# Patient Record
Sex: FEMALE | Race: WHITE | NOT HISPANIC OR LATINO | Employment: OTHER
[De-identification: names, ages, dates, MRNs, and addresses within clinical notes are randomized per-mention and may not be internally consistent; named-entity substitution may affect disease eponyms.]

---

## 2017-09-06 ENCOUNTER — RECORDS - HEALTHEAST (OUTPATIENT)
Dept: ADMINISTRATIVE | Facility: OTHER | Age: 67
End: 2017-09-06

## 2018-02-21 ENCOUNTER — OFFICE VISIT - HEALTHEAST (OUTPATIENT)
Dept: FAMILY MEDICINE | Facility: CLINIC | Age: 68
End: 2018-02-21

## 2018-02-21 ENCOUNTER — COMMUNICATION - HEALTHEAST (OUTPATIENT)
Dept: FAMILY MEDICINE | Facility: CLINIC | Age: 68
End: 2018-02-21

## 2018-02-21 DIAGNOSIS — Z12.11 SCREEN FOR COLON CANCER: ICD-10-CM
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
## 2018-02-21 DIAGNOSIS — Z12.31 VISIT FOR SCREENING MAMMOGRAM: ICD-10-CM

## 2018-02-21 ASSESSMENT — MIFFLIN-ST. JEOR: SCORE: 1601.35

## 2018-03-13 ENCOUNTER — COMMUNICATION - HEALTHEAST (OUTPATIENT)
Dept: FAMILY MEDICINE | Facility: CLINIC | Age: 68
End: 2018-03-13

## 2018-03-30 ENCOUNTER — OFFICE VISIT - HEALTHEAST (OUTPATIENT)
Dept: FAMILY MEDICINE | Facility: CLINIC | Age: 68
End: 2018-03-30

## 2018-03-30 DIAGNOSIS — L72.3 SEBACEOUS CYST OF LEFT AXILLA: ICD-10-CM

## 2018-03-30 DIAGNOSIS — K63.5 COLON POLYPS: ICD-10-CM

## 2018-03-30 DIAGNOSIS — L82.1 SEBORRHEIC KERATOSES: ICD-10-CM

## 2018-03-30 DIAGNOSIS — Z12.39 BREAST CANCER SCREENING: ICD-10-CM

## 2018-03-30 ASSESSMENT — MIFFLIN-ST. JEOR: SCORE: 1595.9

## 2018-04-06 ENCOUNTER — HOSPITAL ENCOUNTER (OUTPATIENT)
Dept: MAMMOGRAPHY | Facility: CLINIC | Age: 68
Discharge: HOME | End: 2018-04-06
Attending: FAMILY MEDICINE

## 2018-04-06 DIAGNOSIS — Z12.31 VISIT FOR SCREENING MAMMOGRAM: ICD-10-CM

## 2018-04-09 ENCOUNTER — OFFICE VISIT - HEALTHEAST (OUTPATIENT)
Dept: FAMILY MEDICINE | Facility: CLINIC | Age: 68
End: 2018-04-09

## 2018-04-09 DIAGNOSIS — Z80.0 FAMILY HISTORY OF COLON CANCER: ICD-10-CM

## 2018-04-09 DIAGNOSIS — K63.5 COLON POLYP: ICD-10-CM

## 2018-04-09 DIAGNOSIS — M54.50 LOW BACK PAIN: ICD-10-CM

## 2018-04-23 ENCOUNTER — OFFICE VISIT - HEALTHEAST (OUTPATIENT)
Dept: PHYSICAL THERAPY | Facility: REHABILITATION | Age: 68
End: 2018-04-23

## 2018-04-23 DIAGNOSIS — M19.90 OSTEOARTHRITIS: ICD-10-CM

## 2018-04-23 DIAGNOSIS — M54.50 LUMBAGO: ICD-10-CM

## 2018-05-21 ENCOUNTER — RECORDS - HEALTHEAST (OUTPATIENT)
Dept: ADMINISTRATIVE | Facility: OTHER | Age: 68
End: 2018-05-21

## 2018-05-22 ENCOUNTER — RECORDS - HEALTHEAST (OUTPATIENT)
Dept: ADMINISTRATIVE | Facility: OTHER | Age: 68
End: 2018-05-22

## 2018-06-04 ENCOUNTER — COMMUNICATION - HEALTHEAST (OUTPATIENT)
Dept: PHYSICAL THERAPY | Facility: CLINIC | Age: 68
End: 2018-06-04

## 2018-06-11 ENCOUNTER — OFFICE VISIT - HEALTHEAST (OUTPATIENT)
Dept: PHYSICAL THERAPY | Facility: REHABILITATION | Age: 68
End: 2018-06-11

## 2018-06-11 DIAGNOSIS — M54.50 LUMBAGO: ICD-10-CM

## 2018-06-11 DIAGNOSIS — M19.90 OSTEOARTHRITIS: ICD-10-CM

## 2018-06-18 ENCOUNTER — OFFICE VISIT - HEALTHEAST (OUTPATIENT)
Dept: PHYSICAL THERAPY | Facility: REHABILITATION | Age: 68
End: 2018-06-18

## 2018-06-18 DIAGNOSIS — M19.90 OSTEOARTHRITIS: ICD-10-CM

## 2018-06-18 DIAGNOSIS — M54.50 LUMBAGO: ICD-10-CM

## 2018-06-25 ENCOUNTER — OFFICE VISIT - HEALTHEAST (OUTPATIENT)
Dept: PHYSICAL THERAPY | Facility: REHABILITATION | Age: 68
End: 2018-06-25

## 2018-06-25 DIAGNOSIS — M54.50 LUMBAGO: ICD-10-CM

## 2018-06-25 DIAGNOSIS — M19.90 OSTEOARTHRITIS: ICD-10-CM

## 2018-07-30 ENCOUNTER — COMMUNICATION - HEALTHEAST (OUTPATIENT)
Dept: FAMILY MEDICINE | Facility: CLINIC | Age: 68
End: 2018-07-30

## 2018-08-02 ENCOUNTER — COMMUNICATION - HEALTHEAST (OUTPATIENT)
Dept: FAMILY MEDICINE | Facility: CLINIC | Age: 68
End: 2018-08-02

## 2018-08-22 ENCOUNTER — OFFICE VISIT - HEALTHEAST (OUTPATIENT)
Dept: PHYSICAL THERAPY | Facility: REHABILITATION | Age: 68
End: 2018-08-22

## 2018-08-22 DIAGNOSIS — M19.90 OSTEOARTHRITIS: ICD-10-CM

## 2018-08-22 DIAGNOSIS — M54.50 LUMBAGO: ICD-10-CM

## 2018-08-31 ENCOUNTER — COMMUNICATION - HEALTHEAST (OUTPATIENT)
Dept: FAMILY MEDICINE | Facility: CLINIC | Age: 68
End: 2018-08-31

## 2018-08-31 DIAGNOSIS — Z00.00 HEALTH CARE MAINTENANCE: ICD-10-CM

## 2018-09-04 ENCOUNTER — AMBULATORY - HEALTHEAST (OUTPATIENT)
Dept: LAB | Facility: CLINIC | Age: 68
End: 2018-09-04

## 2018-09-04 ENCOUNTER — OFFICE VISIT - HEALTHEAST (OUTPATIENT)
Dept: FAMILY MEDICINE | Facility: CLINIC | Age: 68
End: 2018-09-04

## 2018-09-04 DIAGNOSIS — Z00.00 HEALTH CARE MAINTENANCE: ICD-10-CM

## 2018-09-04 DIAGNOSIS — R53.83 FATIGUE: ICD-10-CM

## 2018-09-04 DIAGNOSIS — R63.5 WEIGHT GAIN: ICD-10-CM

## 2018-09-04 DIAGNOSIS — Z00.00 ROUTINE GENERAL MEDICAL EXAMINATION AT A HEALTH CARE FACILITY: ICD-10-CM

## 2018-09-04 LAB
CHOLEST SERPL-MCNC: 227 MG/DL
ERYTHROCYTE [DISTWIDTH] IN BLOOD BY AUTOMATED COUNT: 13.2 % (ref 11–14.5)
FASTING STATUS PATIENT QL REPORTED: YES
FASTING STATUS PATIENT QL REPORTED: YES
GLUCOSE BLD-MCNC: 96 MG/DL (ref 70–125)
HCT VFR BLD AUTO: 41 % (ref 35–47)
HDLC SERPL-MCNC: 46 MG/DL
HGB BLD-MCNC: 13.8 G/DL (ref 12–16)
LDLC SERPL CALC-MCNC: 151 MG/DL
MCH RBC QN AUTO: 27.4 PG (ref 27–34)
MCHC RBC AUTO-ENTMCNC: 33.6 G/DL (ref 32–36)
MCV RBC AUTO: 81 FL (ref 80–100)
PLATELET # BLD AUTO: 238 THOU/UL (ref 140–440)
PMV BLD AUTO: 7.9 FL (ref 7–10)
RBC # BLD AUTO: 5.04 MILL/UL (ref 3.8–5.4)
TRIGL SERPL-MCNC: 151 MG/DL
TSH SERPL DL<=0.005 MIU/L-ACNC: 2.87 UIU/ML (ref 0.3–5)
WBC: 7.6 THOU/UL (ref 4–11)

## 2018-09-04 ASSESSMENT — MIFFLIN-ST. JEOR: SCORE: 1591.37

## 2018-09-17 ENCOUNTER — OFFICE VISIT - HEALTHEAST (OUTPATIENT)
Dept: PHYSICAL THERAPY | Facility: REHABILITATION | Age: 68
End: 2018-09-17

## 2018-09-17 DIAGNOSIS — M19.90 OSTEOARTHRITIS: ICD-10-CM

## 2018-09-17 DIAGNOSIS — M54.50 LUMBAGO: ICD-10-CM

## 2018-10-12 ENCOUNTER — OFFICE VISIT - HEALTHEAST (OUTPATIENT)
Dept: FAMILY MEDICINE | Facility: CLINIC | Age: 68
End: 2018-10-12

## 2018-10-12 DIAGNOSIS — L72.3 SEBACEOUS CYST: ICD-10-CM

## 2018-10-17 ENCOUNTER — OFFICE VISIT - HEALTHEAST (OUTPATIENT)
Dept: PHYSICAL THERAPY | Facility: REHABILITATION | Age: 68
End: 2018-10-17

## 2018-10-17 DIAGNOSIS — M54.50 LUMBAGO: ICD-10-CM

## 2018-10-17 DIAGNOSIS — M19.90 OSTEOARTHRITIS: ICD-10-CM

## 2019-06-09 ENCOUNTER — COMMUNICATION - HEALTHEAST (OUTPATIENT)
Dept: SCHEDULING | Facility: CLINIC | Age: 69
End: 2019-06-09

## 2019-06-09 ENCOUNTER — OFFICE VISIT - HEALTHEAST (OUTPATIENT)
Dept: FAMILY MEDICINE | Facility: CLINIC | Age: 69
End: 2019-06-09

## 2019-06-09 DIAGNOSIS — R05.9 COUGH: ICD-10-CM

## 2019-06-09 DIAGNOSIS — J02.0 STREPTOCOCCAL PHARYNGITIS: ICD-10-CM

## 2019-06-09 DIAGNOSIS — J06.9 UPPER RESPIRATORY TRACT INFECTION, UNSPECIFIED TYPE: ICD-10-CM

## 2019-06-09 DIAGNOSIS — J02.9 SORETHROAT: ICD-10-CM

## 2019-06-09 LAB — DEPRECATED S PYO AG THROAT QL EIA: ABNORMAL

## 2019-06-09 ASSESSMENT — MIFFLIN-ST. JEOR: SCORE: 1484.32

## 2019-12-17 ENCOUNTER — COMMUNICATION - HEALTHEAST (OUTPATIENT)
Dept: SCHEDULING | Facility: CLINIC | Age: 69
End: 2019-12-17

## 2019-12-22 ENCOUNTER — OFFICE VISIT - HEALTHEAST (OUTPATIENT)
Dept: FAMILY MEDICINE | Facility: CLINIC | Age: 69
End: 2019-12-22

## 2019-12-22 DIAGNOSIS — H10.33 ACUTE CONJUNCTIVITIS OF BOTH EYES, UNSPECIFIED ACUTE CONJUNCTIVITIS TYPE: ICD-10-CM

## 2020-02-07 ENCOUNTER — OFFICE VISIT - HEALTHEAST (OUTPATIENT)
Dept: FAMILY MEDICINE | Facility: CLINIC | Age: 70
End: 2020-02-07

## 2020-02-07 DIAGNOSIS — J40 BRONCHITIS: ICD-10-CM

## 2020-02-07 DIAGNOSIS — R05.9 COUGH: ICD-10-CM

## 2020-02-07 DIAGNOSIS — R04.0 EPISTAXIS: ICD-10-CM

## 2020-02-07 DIAGNOSIS — Z12.31 VISIT FOR SCREENING MAMMOGRAM: ICD-10-CM

## 2021-03-12 ENCOUNTER — AMBULATORY - HEALTHEAST (OUTPATIENT)
Dept: NURSING | Facility: CLINIC | Age: 71
End: 2021-03-12

## 2021-04-02 ENCOUNTER — AMBULATORY - HEALTHEAST (OUTPATIENT)
Dept: NURSING | Facility: CLINIC | Age: 71
End: 2021-04-02

## 2021-05-17 ENCOUNTER — OFFICE VISIT - HEALTHEAST (OUTPATIENT)
Dept: FAMILY MEDICINE | Facility: CLINIC | Age: 71
End: 2021-05-17

## 2021-05-17 DIAGNOSIS — N95.8 OTHER SPECIFIED MENOPAUSAL AND PERIMENOPAUSAL DISORDERS: ICD-10-CM

## 2021-05-17 DIAGNOSIS — E78.2 MIXED HYPERLIPIDEMIA: ICD-10-CM

## 2021-05-17 DIAGNOSIS — Z12.31 VISIT FOR SCREENING MAMMOGRAM: ICD-10-CM

## 2021-05-17 DIAGNOSIS — Z13.820 SCREENING FOR OSTEOPOROSIS: ICD-10-CM

## 2021-05-17 DIAGNOSIS — R07.89 ATYPICAL CHEST PAIN: ICD-10-CM

## 2021-05-17 DIAGNOSIS — E66.01 MORBID OBESITY (H): ICD-10-CM

## 2021-05-17 LAB
ATRIAL RATE - MUSE: 76 BPM
DIASTOLIC BLOOD PRESSURE - MUSE: NORMAL
INTERPRETATION ECG - MUSE: NORMAL
P AXIS - MUSE: 42 DEGREES
PR INTERVAL - MUSE: 164 MS
QRS DURATION - MUSE: 76 MS
QT - MUSE: 358 MS
QTC - MUSE: 402 MS
R AXIS - MUSE: 5 DEGREES
SYSTOLIC BLOOD PRESSURE - MUSE: NORMAL
T AXIS - MUSE: 18 DEGREES
VENTRICULAR RATE- MUSE: 76 BPM

## 2021-05-17 ASSESSMENT — MIFFLIN-ST. JEOR: SCORE: 1534.67

## 2021-05-27 VITALS
DIASTOLIC BLOOD PRESSURE: 74 MMHG | OXYGEN SATURATION: 96 % | BODY MASS INDEX: 37.53 KG/M2 | TEMPERATURE: 100 F | WEIGHT: 225.25 LBS | HEART RATE: 81 BPM | SYSTOLIC BLOOD PRESSURE: 126 MMHG | HEIGHT: 65 IN

## 2021-05-29 NOTE — PROGRESS NOTES
ASSESSMENT:   1. Sorethroat     2. Streptococcal pharyngitis  Rapid Strep A Screen-Throat swab    amoxicillin (AMOXIL) 500 MG capsule   3. Cough  benzonatate (TESSALON) 200 MG capsule   4. Upper respiratory tract infection, unspecified type  fluticasone propionate (FLONASE) 50 mcg/actuation nasal spray       PLAN:  Results from the rapid strep test was positive today, consistent with patients history and physical exam. Patient was prescribed amoxicillin antibiotic to be taken twice a day for 10. Patient was also encouraged to replace her toothbrush after 2 doses of the antibiotic. In addition to treatment for streptococcal pharyngitis, the patient was also provided with OTC and prescriptions remedies for an upper respiratory infection.     Patient instructed to follow-up with primary care if symptoms persist or as needed.     I discussed red flag symptoms, return precautions to clinic/ER and follow up care with patient.  Expected clinical course, symptomatic cares advised. Questions answered. Patient/parent amenable with plan.    Patient Instructions:  Patient Instructions   Your rapid strep test was positive today. We will treat you with a course of antibiotics. Please complete the full course of antibiotics. Please take with food. Take a probiotic or eat a Greek yogurt daily while you are on the antibiotic. You will be contagious until you have completed 24 hours of the medication.  Please discard your toothbrush and replace with a new toothbrush to avoid reinfection.    Please use Tylenol 650mg every 4 hours or ibuprofen 600mg every 6 hours by mouth for pain/fever.  Do not exceed 4000mg of acetaminophen or 2400mg of ibuprofen from any source in a 24 hour period.  Taking Tylenol and ibuprofen together may be helpful in reducing pain.      May use salt water gargles and hot teas for comfort. Dissolve one teaspoon of salt in one cup of warm water to make a salt water gargle.    Watch for resolution of symptoms in  "the next few days. If you continue to have fevers, begin to have difficulty swallowing or breathing, notice neck pain or difficulty moving your neck, please return to clinic or present to the ER immediately.  Otherwise, follow up with your PCP as needed.    May use tessalon for cough, Flonase for nasal symptoms.      SUBJECTIVE:   Susan Contreras is a 69 y.o. female who presents alone today with 5 days complaint of sore throat, in which she describes as dry, feeling like \"knives when I swallow\". She also complains of nasal congestion, rhinorrhea, watery itchy  eyes, increased mucus production with productive cough. She states most nights she has to sleep in her recliner due to her cough. Denies fever, stating she has been sweating more than normal, but is unsure if she has been febrile. Sick contacts: denies. Has not tried any over the counter treatments. Oral intake has decreased due to discomfort, she has been able to continue with fluid intake.       ROS:  Comprehensive 12 pt ROS completed, positives noted in HPI, otherwise negative.      Past Medical History:  Patient Active Problem List   Diagnosis     Hyperhidrosis     Dysfunctional Uterine Bleeding     Arthritis     Hyperglycemia     Lower Back Pain     Headache     Esophageal Reflux     Lump In / On The Skin      Migraine     Osteoarthritis     Benign neoplasm of colon     Benign neoplasm of sigmoid colon     Special screening for malignant neoplasms, colon     Diverticular disease of colon     Colorectal polyps     Diverticular disease of large intestine     Diverticulosis large intestine w/o perforation or abscess w/o bleeding     Diverticulosis of colon     Encounter for screening for malignant neoplasm of colon     Family history of malignant neoplasm of gastrointestinal tract     Neoplasm of uncertain behavior of stomach, intestines, and rectum     Polyp of colon     History of colonic polyps     Hemorrhoids       Surgical History:  Past Surgical " "History:   Procedure Laterality Date     [place] DILATION/CURETTAGE,DIAGNOSTIC      Description: Dilation And Curettage;  Recorded: 07/08/2014;     [place] REMOVAL OF TONSILS,<11 Y/O      Description: Tonsillectomy;  Recorded: 07/08/2014;     SHOULDER ARTHROSCOPY  1982           Family History:  Family History   Problem Relation Age of Onset     Heart disease Mother      Lymphoma Mother      Diabetes Father        Reviewed; Non-contributory    Social History     Tobacco Use   Smoking Status Never Smoker   Smokeless Tobacco Never Used       Smoking: denies    Smoke exposure: denies; reports second-hand smoke exposure as a child from parents; additionally, 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       No current facility-administered medications on file prior to visit.        Allergies:   Allergies   Allergen Reactions     Acetaminophen Unknown     Codeine Nausea And Vomiting     House Dust      Hydrocodone Bitartrate Unknown     Hydrocodone-Acetaminophen      Abdominal pain       Latex Unknown       OBJECTIVE:   Vitals:    06/09/19 0935   BP: 110/60   Pulse: 83   Resp: 14   Temp: 98.3  F (36.8  C)   TempSrc: Oral   SpO2: 97%   Weight: 212 lb 6.4 oz (96.3 kg)   Height: 5' 5\" (1.651 m)     Physical exam reveals a pleasant 69 y.o. female.   General: Appears healthy, alert and cooperative. Non-toxic appearance.  Eyes:  No conjunctivitis, lids normal.   Ears:  Normal appearing auricle. External auditory meatus without excessive cerumen, edema or erythema. normal TMs bilaterally.  No mastoid tenderness. No pain with palpation over tragus.  Nose:    Mucosa normal. No rhinorrhea. No sinus tenderness.  Mouth:  Mucosa pink and moist.  moderate erythema and postnasal drip. No trismus. No evidence of PTA. Normal " phonation.  Neck: normal, supple and no adenopathy  Pulmonary/Chest: Chest is clear, no wheezing, rhonchi or rales. Symmetric air entry throughout both lung fields. Symmetrical chest wall movement.  Heart: regular rate and rhythm, no murmur, rub or gallop  Abdomen: soft, nontender. No masses or organomegaly  Neuro: Alert, oriented. Non-focal.  Skin: pink, warm, dry, and without lesions on limited skin exam. No rashes.  Psychiatric: Normal mood and affect.  Normal judgement and thought content. Normal behavior.       RADIOLOGY    No results found.    LABORATORY STUDIES    Recent Results (from the past 24 hour(s))   Rapid Strep A Screen-Throat swab   Result Value Ref Range    Rapid Strep A Antigen Group A Strep detected (!) No Group A Strep detected, presumptive negative       Dayana Kennedy DNP Student      I, Zuleika Kc, was present with the NP student who participated in the service and in the documentation of the note.  I have verified the history and personally performed the physical exam and medical decision making.  I agree with the assessment and plan of care as documented in the note.       Zuleika Kc, CNP

## 2021-05-29 NOTE — TELEPHONE ENCOUNTER
Pt calling with sore throat and coughing up green and yellow phlegm, moving more into the chest, no wheezing. Pt states sweaty.  Pt not able to sleep in bed last night, due to coughing pt had to sleep in the chair for the night.  Pt intends to go to [facility].  Domonique Griggs RN, MA  [facility] RN Triage Nurse Advisor      Reason for Disposition    [1] Continuous (nonstop) coughing interferes with work or school AND [2] no improvement using cough treatment per Care Advice    Protocols used: COUGH - ACUTE [id number]

## 2021-05-29 NOTE — PATIENT INSTRUCTIONS - HE
Your rapid strep test was positive today. We will treat you with a course of antibiotics. Please complete the full course of antibiotics. Please take with food. Take a probiotic or eat a Greek yogurt daily while you are on the antibiotic. You will be contagious until you have completed 24 hours of the medication.  Please discard your toothbrush and replace with a new toothbrush to avoid reinfection.    Please use Tylenol 650mg every 4 hours or ibuprofen 600mg every 6 hours by mouth for pain/fever.  Do not exceed 4000mg of acetaminophen or 2400mg of ibuprofen from any source in a 24 hour period.  Taking Tylenol and ibuprofen together may be helpful in reducing pain.      May use salt water gargles and hot teas for comfort. Dissolve one teaspoon of salt in one cup of warm water to make a salt water gargle.    Watch for resolution of symptoms in the next few days. If you continue to have fevers, begin to have difficulty swallowing or breathing, notice neck pain or difficulty moving your neck, please return to clinic or present to the ER immediately.  Otherwise, follow up with your PCP as needed.    May use tessalon for cough, Flonase for nasal symptoms.

## 2021-06-01 VITALS — WEIGHT: 236 LBS | BODY MASS INDEX: 39.32 KG/M2 | HEIGHT: 65 IN

## 2021-06-01 VITALS — BODY MASS INDEX: 39.49 KG/M2 | WEIGHT: 237 LBS | HEIGHT: 65 IN

## 2021-06-01 VITALS — HEIGHT: 65 IN | WEIGHT: 238.2 LBS | BODY MASS INDEX: 39.69 KG/M2

## 2021-06-02 VITALS — WEIGHT: 237 LBS | BODY MASS INDEX: 39.44 KG/M2

## 2021-06-03 VITALS — BODY MASS INDEX: 35.39 KG/M2 | HEIGHT: 65 IN | WEIGHT: 212.4 LBS

## 2021-06-04 VITALS
BODY MASS INDEX: 31.85 KG/M2 | SYSTOLIC BLOOD PRESSURE: 117 MMHG | OXYGEN SATURATION: 98 % | TEMPERATURE: 97.8 F | HEART RATE: 73 BPM | RESPIRATION RATE: 17 BRPM | DIASTOLIC BLOOD PRESSURE: 69 MMHG | WEIGHT: 191.4 LBS

## 2021-06-04 VITALS
WEIGHT: 190 LBS | OXYGEN SATURATION: 99 % | DIASTOLIC BLOOD PRESSURE: 68 MMHG | TEMPERATURE: 100.1 F | SYSTOLIC BLOOD PRESSURE: 106 MMHG | HEART RATE: 88 BPM | BODY MASS INDEX: 31.62 KG/M2

## 2021-06-04 NOTE — TELEPHONE ENCOUNTER
"Pink eye since Friday.  Yesterday went to eye doctor. Eye exam was normal and diagnosed with viral pink eye, no medication needed.    Now both eyes this morning.    Both look better than yesterday.    Discharge in the morning.  Cleans it off with tissue.    Patient asking for eye drops to be called in for her pink eye.  She cares for her 10 year old grandson and does not want it to spread.    Has been working hard at weight [employer].  Down to 195#.  Patient vented about her \"free  son\".  Trouble with her adult son living there and she says as of the new year she will be charging him $600 rent and expecting more from him.  He lives with his girlfriend and his 10 year old son in the other side of the duplex. The new year will bring change.  Must put more on him and make him do more for himself.  She is tired and always short of money.  This is \"the year of Susan\".  She wants pcp to know she is doing well and losing weight.  She knows she is due for a physical also.    Clary Arellano RN Triage and refills      Reason for Disposition    Very small amount of discharge and only in corner of eye    Protocols used: EYE - PUS OR [id number]      "

## 2021-06-04 NOTE — TELEPHONE ENCOUNTER
Please inform patient that there is no indication for eyedrops.  A prescription for eyedrop medication will not prevent the spread of such an infection.  When pinkeye is caused by a virus as most cases are and that is what her eye doctor felt it was antibiotics will do no good to treat it or to prevent the spread.  She just needs to be diligent about washing her hands.

## 2021-06-05 NOTE — PATIENT INSTRUCTIONS - HE
For your symptoms:  -nasal saline spray/wash (netipot) - would also help moisturize your nasal tissue to prevent nosebleeds  -using a humidifer  -ok to put thin layer of vaseline in your nose to moisturize tissue as well  -pinch nostrils closed and breathe with your mouth  -consider starting oral antihistamine such claritin/allegra during the day and could consider benadryl at night  -ok to use robitussin or cough drops/lozenges  -start tessalon perles for cough suppression three times a day as needed  -ok to use vicks rub  -print a prescription for azithromycin antibiotic for you to fill if this is not improving over the new few days or if it is worsening    If things are significantly worsening make sure you are seen again for a recheck

## 2021-06-05 NOTE — PROGRESS NOTES
Assessment/Plan:    1. Cough  2. Bronchitis  Most likely bronchitis is cause of pt's symptoms. Discussed management with the following: start oral antihistamine, robitussin/cough drops as needed, start tessalon perles, provided printed prescription for azithromycin for pt to fill in a few days if symptoms not improving, consider vicks rub. Recheck as needed.   - benzonatate (TESSALON PERLES) 100 MG capsule; Take 1 capsule (100 mg total) by mouth 3 (three) times a day as needed for cough.  Dispense: 30 capsule; Refill: 0  - azithromycin (ZITHROMAX Z-SARANYA) 250 MG tablet; Take 2 tablets (500 mg) on  Day 1,  followed by 1 tablet (250 mg) once daily on Days 2 through 5.  Dispense: 6 tablet; Refill: 0    3. Epistaxis  Had nosebleed earlier today - no bleeding currently. Recommend the following: start using humidifier, start nasal saline, put thin layer of vaseline on nasal septum, if bleeding occurs pinch nasal bridge.     4. Visit for screening mammogram  Due for mammogram - ordered today.  - Mammo Screening Bilateral; Future      Follow up: 1 week for recheck if needed    Leticia Obrien MD  [facility]    Subjective:    Patient ID: Susan Contreras is a 69 y.o. female is here today for cough, congestion    Cough, congestion  -Tuesday symptoms started - but thought may have been dust/allergy related  -Wednesday woke up feeling much more sick - cough, hoarse voice  -Thursday morning - no voice, yellow mucus/phlegm, nasal congestion  -earlier today got bloody nose - right side always she says, hx cauterization  -no known sick contacts but recently went to "[employer]  -thinks may have been having fever Wed night - chills at that time  -reports hx pneumonia  -hasn't tried any OTC meds yet  -feels like azithromycin helped for similar sx in the past  -low grade fever but otherwise nml vitals      Patient Active Problem List   Diagnosis     Hyperhidrosis     Dysfunctional Uterine Bleeding     Arthritis      Hyperglycemia     Lower Back Pain     Headache     Esophageal Reflux     Lump In / On The Skin      Migraine     Osteoarthritis     Benign neoplasm of colon     Diverticular disease of colon     Diverticulosis of colon     Family history of malignant neoplasm of gastrointestinal tract     Hemorrhoids     Past Medical History:   Diagnosis Date     Arthritis      Past Surgical History:   Procedure Laterality Date     [place] DILATION/CURETTAGE,DIAGNOSTIC      Description: Dilation And Curettage;  Recorded: 07/08/2014;     [place] REMOVAL OF TONSILS,<13 Y/O      Description: Tonsillectomy;  Recorded: 07/08/2014;     SHOULDER ARTHROSCOPY  1982     No current outpatient medications on file prior to visit.     No current facility-administered medications on file prior to visit.      Allergies   Allergen Reactions     Acetaminophen Unknown     Codeine Nausea And Vomiting     House Dust      Hydrocodone Bitartrate Unknown     Hydrocodone-Acetaminophen      Abdominal pain       Latex Unknown     Social History     Socioeconomic History     Marital status:      Spouse name: Not on file     Number of children: Not on file     Years of education: Not on file     Highest education level: Not on file   Occupational History     Not on file   Social Needs     Financial resource strain: Not on file     Food insecurity:     Worry: Not on file     Inability: Not on file     Transportation needs:     Medical: Not on file     Non-medical: Not on file   Tobacco Use     Smoking status: Never Smoker     Smokeless tobacco: Never Used   Substance and Sexual Activity     Alcohol use: Not on file     Drug use: Not on file     Sexual activity: Not on file   Lifestyle     Physical activity:     Days per week: Not on file     Minutes per session: Not on file     Stress: Not on file   Relationships     Social connections:     Talks on phone: Not on file     Gets together: Not on file     Attends Jewish service: Not on file     Active member  of club or organization: Not on file     Attends meetings of clubs or organizations: Not on file     Relationship status: Not on file     Intimate partner violence:     Fear of current or ex partner: Not on file     Emotionally abused: Not on file     Physically abused: Not on file     Forced sexual activity: Not on file   Other Topics Concern     Not on file   Social History Narrative     Not on file     Family History   Problem Relation Age of Onset     Heart disease Mother      Lymphoma Mother      Diabetes Father      Review of systems is as stated in HPI, and the remainder of system review is otherwise negative.    Objective:      /68   Pulse 88   Temp 100.1  F (37.8  C)   Wt 190 lb (86.2 kg)   SpO2 99%   BMI 31.62 kg/m      General appearance: awake, NAD  HEENT: atraumatic, normocephalic, PERRL, no scleral icterus or injection, TMs normal bilaterally without erythema or effusion, no sinus tenderness, no erythema of posterior oropharynx, moist mucous membranes, mildly hoarse voice, no epistaxis  Neck: supple, no lymphadenopathy, normal ROM  CV: RRR, no murmurs/rubs/gallops, normal S1 and S2  Lungs: CTAB, no wheezes or crackles, breathing comfortably on room air, frequent cough observed   Extremities: no LE edema bilaterally, moving all extremities  Skin: no rashes or lesions  Neuro: alert, oriented x3, CNs grossly intact, no focal deficits appreciated  Psych: normal mood/affect/behavior, answering questions appropriately, linear thought process

## 2021-06-16 PROBLEM — K64.9 HEMORRHOIDS: Status: ACTIVE | Noted: 2018-09-04

## 2021-06-16 PROBLEM — E66.01 MORBID OBESITY (H): Status: ACTIVE | Noted: 2021-05-17

## 2021-06-16 NOTE — PROGRESS NOTES
Assessment:     1. Fall (on) (from) other stairs and steps, initial encounter     2. Visit for screening mammogram  Mammo Screening Bilateral   3. Screen for colon cancer  Ambulatory referral for Colonoscopy       Plan:     1. Visit for screening mammogram  Chart review the following will be ordered  - Mammo Screening Bilateral; Future    2. Screen for colon cancer  Patient needs the following test which was not done by the patient last year; she needs to see Dr. Sandoval for complete examination in 2 months and immunization update  - Ambulatory referral for Colonoscopy    3. Fall (on) (from) other stairs and steps, initial encounter  Signs of late head injury were discussed with patient quite low risk for intra-cranial hematoma but situation discussed      Subjective:   Patient was well but unfortunately she was going out to her mailbox and did not wear any spikes or well cleated boots and slipped on her stairs landed on her left occipital scalp took most of the blow with her buttocks on the left side and her left shoulder she had some bleeding from her head wound she applied some ice she did not develop any nausea or headache today she is aching in her shoulder and in her hip but minimally so she is able to walk without any discomfort she has no complaints with regards to headache nausea or vomiting or disturbance of vision over the cranial nerves she has a well-healed superficial laceration of the left occiput which does not need to be secondarily repaired.  Funduscopic exam unremarkable orthopedic examination and musculoskeletal exam is suspicious for just a contusion of the superior shoulder muscles on the left side range of motion of left shoulder and arm normal gait and station normal no hip pain whatsoever.  Situation discussed with patient we may discharge her to home I do not feel imaging is any important at this point if she develops further symptoms or signs of intracerebral hematoma she will call us she  "needs to return in 2 months to see Dr. Schaeffer for immunization update    Review of Systems: A complete 14 point review of systems was obtained and is negative or as stated in the history of present illness.    Past Medical History:   Diagnosis Date     Arthritis      Family History   Problem Relation Age of Onset     Heart disease Mother      Lymphoma Mother      Diabetes Father      Past Surgical History:   Procedure Laterality Date     [place] DILATION/CURETTAGE,DIAGNOSTIC      Description: Dilation And Curettage;  Recorded: 07/08/2014;     [place] REMOVAL OF TONSILS,<13 Y/O      Description: Tonsillectomy;  Recorded: 07/08/2014;     SHOULDER ARTHROSCOPY  1982     Social History   Substance Use Topics     Smoking status: Never Smoker     Smokeless tobacco: Never Used     Alcohol use None         Objective:   /72 (Patient Site: Left Arm, Patient Position: Sitting, Cuff Size: Adult Large)  Pulse 88  Resp 18  Ht 5' 5\" (1.651 m)  Wt (!) 238 lb 3.2 oz (108 kg)  SpO2 98%  BMI 39.64 kg/m2    General Appearance:  Alert, cooperative, no distress  Head:  Normocephalic, no obvious abnormality  Ears: TM anatomy normal  Eyes:  PERRL, EOM's intact, conjunctiva and corneas clear  Nose:  Nares symmetrical, septum midline, mucosa pink, no sinus tenderness  Throat:  Lips, tongue, and mucosa are moist, pink, and intact  Neck:  Supple, symmetrical, trachea midline, no adenopathy; thyroid: no enlargement, symmetric,no tenderness/mass/nodules; no carotid bruit, no JVD  Back:  Symmetrical, no curvature, ROM normal, no CVA tenderness  Chest/Breast:  No mass or tenderness  Lungs:  Clear to auscultation bilaterally, respirations unlabored   Heart:  Normal PMI, regular rate & rhythm, S1 and S2 normal, no murmurs, rubs, or gallops  Abdomen:  Soft, non-tender, bowel sounds active all four quadrants, no mass, or organomegaly  Musculoskeletal:  Tone and strength strong and symmetrical, all extremities patient has some pain in the superior " portion of her shoulder near the midline AC joint tight no glenoid tenderness range of motion shoulder all normal  Lymphatic:  No adenopathy  Skin/Hair/Nails:  Skin warm, dry, and intact, no rashes  Neurologic:  Alert and oriented x3, no cranial nerve deficits, normal strength and tone, gait steady funduscopic exam negative  Extremities:  No edema.  Dior's sign negative.  Some bruising over left posterior buttock and anterior thigh but no hip pain at all  Genitourinary: deferred  Pulses:  Equal bilaterally           This note has been dictated using voice recognition software. Any grammatical or context distortions are unintentional and inherent to the the software.

## 2021-06-17 NOTE — PROGRESS NOTES
" Patient ID: Susan Contreras is a 67 y.o. female.  /76  Pulse 70  Ht 5' 5\" (1.651 m)  Wt (!) 237 lb (107.5 kg)  SpO2 98%  BMI 39.44 kg/m2    Assessment/Plan:                Diagnoses and all orders for this visit:    Seborrheic keratoses    Sebaceous cyst of left axilla    Colon polyps    Breast cancer screening    DISCUSSION  The concerns near the breast tissue are long-standing and clear-cut benign findings.  I do not feel she requires a diagnostic mammogram based on these dermatologic benign concerns.  I recommend she get her mammogram scheduled soon for routine screening.    She should schedule her colonoscopy.    Discussed these considerations in depth today for a total of 25 minutes.  Subjective:     HPI    Susan Contreras is a 67-year-old female who does not have any major health concerns.  She is here today after having not been seen for nearly 2 years by me.  Patient is here primarily to discuss colon cancer and breast cancer screening but she brings up multiple other considerations and has a follow-up visit scheduled relatively soon.    Patient had reported she has a lump in the skin and a cyst in her left axilla.  She was trying to schedule a routine mammogram but upon this report she was instructed to come in to consider the need for diagnostic mammography.  She has a long-standing history of a seborrheic keratosis in the skin of the medial left breast.  This is what she is referring to by a lump on the skin.  This is long-standing and unchanged.  She is a history of cystic structures within the left axilla.  She has had previous incisions and cyst removals.  Patient reports that there is a residual small cyst in the left axilla that is unchanged.    She does have a family history of colon cancer and a personal history of colon polyps she is overdue for routine colonoscopy screening.  She does have the test scheduled but was quite apprehensive about it and we spent some time today " "discussing this.    He was seen fairly recently for a fall and also is reporting some flareup of chronic back symptoms.  We touched on these issues briefly today.    Review of Systems  Complete review of systems is obtained.  Other than the specific considerations noted above complete review of systems is negative.    Objective:   Medications:  Current Outpatient Prescriptions   Medication Sig     ibuprofen (ADVIL,MOTRIN) 200 MG tablet Take 200 mg by mouth every 6 (six) hours as needed for pain.     Allergies:  Allergies   Allergen Reactions     Codeine      House Dust      Hydrocodone-Acetaminophen      Abdominal pain       Tobacco:   reports that she has never smoked. She has never used smokeless tobacco.     Physical Exam      /76  Pulse 70  Ht 5' 5\" (1.651 m)  Wt (!) 237 lb (107.5 kg)  SpO2 98%  BMI 39.44 kg/m2      General: Patient alert no signs of distress    HEENT: Tympanic membranes are clear no scleral icterus or conjunctival irritation    Lungs: Clear no wheeze crackle or other focal sounds    Heart: Regular rate and rhythm no murmur    Breast: Limited exam, area of concern in the skin is clearly a seborrheic keratosis which has been present long-term.  No concern or need for diagnostic mammogram based on this.  The second concern is a skin cyst which is long-standing in the left axilla.  It is not inflamed and shows no new characteristics compared to previous exams.            "
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
## 2021-06-17 NOTE — PROGRESS NOTES
"    Assessment & Plan     Susan was seen today for chest pain this past saturday.    Diagnoses and all orders for this visit:    Atypical chest pain  -     Electrocardiogram Perform and Read  -     XR Chest 2 Views  -     NM Exercise Stress Test; Future  -Chest x-ray and EKG are normal.  Suspect that her discomfort is musculoskeletal in nature given tenderness of left anterior ribs with palpation on exam today.  However she does have some risk factors for heart disease including obesity, age and underlying hyperlipidemia.  We also discussed that GERD can be the cause of her discomfort as well.  We will get a stress test to further evaluate for underlying cardiovascular disease as cause of her symptoms.  Encouraged her to schedule an appointment with her PCP for physical and fasting labs.  If she experiences another episode of similar chest pain, would recommend she be seen in emergency department    Visit for screening mammogram  -     Mammo Screening Bilateral; Future    Screening for osteoporosis  -     DXA Bone Density Scan; Future    Morbid obesity (H)  She is doing weight watchers and working on weight loss efforts    Other specified menopausal and perimenopausal disorders   -     DXA Bone Density Scan; Future    Mixed hyperlipidemia  She is working on weight loss efforts.  Encouraged her to schedule appointment with PCP for physical and fasting labs               BMI:   Estimated body mass index is 38.07 kg/m  as calculated from the following:    Height as of this encounter: 5' 4.5\" (1.638 m).    Weight as of this encounter: 225 lb 4 oz (102.2 kg).   The following high BMI interventions were performed this visit: encouragement to exercise    Return in about 3 months (around 8/17/2021) for Annual physical.    Arabella Amaya MD  [facility]   Susan Contreras is 71 y.o. and presents today for the following health issues   HPI   She is seen today for concern of an episode " of chest pain that occurred on Saturday which is 2 days ago.  This occurred about 5 PM and lasted several hours.  She has history of obesity and mild hyperlipidemia, not on medication.  She is not a smoker.  Reports that she has had intermittent chest pain over the last several years.  Usually these episodes will go away fairly quickly.  On Saturday she experienced a sharp and stabbing pain on the left side of her chest.  This was quite uncomfortable.  Lasted several hours and eventually started to ease up about 10 PM.  Pain has subsequently resolved.  She comes in today because she is concerned about whether could be her heart or her lungs.  Reports that she does have a history of pneumonia.  She also has history of secondhand smoke exposure.  She reports history of GERD and states that she has tendency to get food caught on the left side of her throat.  She has been doing some heavy lifting about a week ago.  States that she did go walking at the [employer] on Friday afternoon with a friend and recalls that she did feel a little bit winded while doing so and had to stop and rest.  She has been under a lot of stress lately.  She is overweight but has lost 50 pounds through weight watchers and is working on dietary changes.  She drinks about 1 cup of coffee a day.  She recalls that on Saturday prior to the episode of chest pain she really did not have the best diet.  She ate some she does and rice crispy bar around 1 PM.  Had a bowl of Cheerios around 11 AM.  Had her usual cup of coffee with some cookies first thing in the morning.  There is family history of heart problems in her mother.  Her grandmother had a pacemaker.  Patient has not had a physical or labs in a couple of years.  Review of systems is otherwise negative.  No other concerns or questions today.          Review of Systems   All other systems reviewed and are negative.          Objective    /74 (Patient Site: Right Arm, Patient Position:  "Sitting, Cuff Size: Adult Large)   Pulse 81   Temp 100  F (37.8  C) (Temporal)   Ht 5' 4.5\" (1.638 m)   Wt (!) 225 lb 4 oz (102.2 kg)   SpO2 96%   BMI 38.07 kg/m    Body mass index is 38.07 kg/m .  Physical Exam   Constitutional: She appears well-developed and well-nourished.   Cardiovascular: Normal rate, regular rhythm and normal heart sounds.   No murmur heard.  Pulmonary/Chest: Effort normal and breath sounds normal. No respiratory distress. She has no wheezes.   Abdominal: Soft. Bowel sounds are normal. She exhibits no distension. There is abdominal tenderness in the epigastric area.   Musculoskeletal: There is tenderness with palpation over the left lower rib cage underneath the left breast    Results: EKG was performed in clinic.  This was personally reviewed.  Per my read it was normal.    Chest x-ray was performed in clinic and personally reviewed.  Per my read this was negative                "

## 2021-06-17 NOTE — PATIENT INSTRUCTIONS - HE
Patient Instructions by Cristian Price DO at 12/22/2019  8:00 AM     Author: Cristian Price DO Service: -- Author Type: Physician    Filed: 12/22/2019  8:31 AM Encounter Date: 12/22/2019 Status: Addendum    : Cristian Price DO (Physician)    Related Notes: Original Note by Cristian Price DO (Physician) filed at 12/22/2019  8:30 AM       I recommend you use the antibiotic eye drop treatment if the mattering should recur you noted yesterday. See the handout for treatment advice. I think your eye condition is likely viral in origin, will likely continue to improve without antibiotic treatment.     Patient Education     Conjunctivitis Caused by Infection     Wash hands often to help prevent spreading infection.     Infections are caused by viruses or germs (bacteria). Treatment includes keeping your eyes and hands clean. Your healthcare provider may prescribe eye drops, and tell you to stay home from work or school if youre contagious. Untreated infections can be serious. It's important to see your provider for a diagnosis.  Viral infections  A cold, flu, or other virus can spread to your eyes. This causes a watery discharge. Your eyes may burn or itch and get red. Your eyelids may also be puffy and sore.  Treatment  Most viral infections go away on their own. Artificial tears and warm compresses can relieve symptoms. Your healthcare provider may also prescribe eye drops. A viral infection can be very contagious and spread quickly. To prevent this, wash your hands often. Use a separate tissue to wipe each eye. Dont touch your eyes or share bedding or towels. Use a new, clean washcloth every day. Throw away eye cosmetics, especially mascara. Never use someone else's eye cosmetics. If you use contact lenses, follow your healthcare provider's instructions on proper lens care.   Bacterial infections  Bacterial infections often happen in one eye. There may be a watery or a thick discharge from the eye. These  infections can cause serious damage to your eye if not treated promptly.  Treatment  Your healthcare provider may prescribe eye drops or ointment to kill the bacteria. Use the medicine for the number of days it is prescribed. Don't stop using it when the symptoms improve. Warm compresses can help keep the eyelids clean. To keep the bacteria from spreading, wash your hands often. Use a separate tissue to wipe each eye. Don't touch your eyes or share bedding or towels. Use a new, clean washcloth every day. Throw away eye cosmetics, especially mascara. Never use someone else's eye cosmetics. If you use contact lenses, follow your healthcare provider's instructions on proper lens care.   Date Last Reviewed: 10/1/2017    5165-6763 [employer] [facility]. [address] [place] [address], [place] [address]. All rights reserved. This information is not intended as a substitute for professional medical care. Always follow your healthcare professional's instructions.
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
## 2021-06-18 NOTE — PROGRESS NOTES
[facility] Daily Progress /Monthly Summary    Patient Name: Susan Contreras  Date: 2018  Visit #:    Referral Diagnosis: Low Back Pain  Referring provider: Jerod Schaeffer MD  Visit Diagnosis:     ICD-10-CM    1. Lower Back Pain M54.5    2. Osteoarthritis M19.90          Assessment:     Patient is benefitting from skilled physical therapy and is making steady progress toward functional goals.    Goal Status:  Patient will twist/turn : for cooking/cleaning;for bed mobilitiy;with less difficulty;in 12 weeks;Comment  Comment:: with 0-5/10 LBP: current status is 1-10/10, generally 0-5/10 in the last week.  Pt will: lift objects to 25# including groceries with 0-5/10 LBP in 12 weeks: current status is that this is still very hard to do,- 6-7/10, and sore the day after doing it. Even milk is still hard to carry.  Pt will: have resting LBP 0-3/10 in 12 weeks: Current status is 0-1/10, better       Plan / Patient Education:     Continue with initial plan of care.   Continue ALL and visceral fascia releases to decrease  LBP and improve twisting  Add supine trunk rotation    Subjective:     Pain Ratin  I am doing so much better with my back pain than 2-3 months ago!      Objective:     Modified Oswestry Low Back Pain Disablity Questionnaire  in %: 54   Trunk flexion is 7 1/2 inches fingertips to floor- 2/10 LBP  Visually has an increase in right waist crease and increased right quadratus lumborum.    Treatment Today     TREATMENT MINUTES COMMENTS   Evaluation     Self-care/ Home management     Manual therapy 55 Supine MT/SCS to neck, chest and lumbar region including: PLLT1-L5 stacked dysfunctions, Left BRANDI-LV, Right IJ and R PEC-LV,    Neuromuscular Re-education     Therapeutic Activity     Therapeutic Exercises     Gait training     Modality__________________                Total 55    Blank areas are intentional and mean the treatment did not include these items.       Carlotta DIXON  [facility]  6/11/2018

## 2021-06-18 NOTE — PROGRESS NOTES
[facility] Daily Progress     Patient Name: Susan Contreras  Date: 2018  Visit #:    Referral Diagnosis:  Low Back Pain  Referring provider: Jerod Schaeffer MD  Visit Diagnosis:     ICD-10-CM    1. Lower Back Pain M54.5    2. Osteoarthritis M19.90          Assessment:     Patient is benefitting from skilled physical therapy and is making steady progress toward functional goals.   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 She reports by the end of the week she needed to wear her back brace due to LBP being and 8-10/10, even to bed.    Goal Status:  Patient will twist/turn : for cooking/cleaning;for bed mobilitiy;with less difficulty;in 12 weeks;Comment  Comment:: with 0-5/10 LBP: current status is 1-10/10, generally 0-5/10 in the last week.  Pt will: lift objects to 25# including groceries with 0-5/10 LBP in 12 weeks: current status is that this is still very hard to do,- 6-7/10, and sore the day after doing it. Even milk is still hard to carry.  Pt will: have resting LBP 0-3/10 in 12 weeks: Current status is 0-1/10, better    Plan / Patient Education:     Continue with initial plan of care.    Subjective:     Pain Ratin I am doing pretty good except when I bend repetitively or twisting.  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 I waited 22 minutes for my brother in his new condo- made me busy.  The school had no air conditioning and it was hard to breath!    Prolonged standing while teaching increased my LBP to 8-10/10, and also stooping and reaching and lifting caused me increased pain.    Objective:     Trunk flexion is 6 inches fingertips to floor  Right groin and LBP and left front thigh to the knee -toothache.  PSIS level    Treatment Today     TREATMENT MINUTES COMMENTS   Evaluation     Self-care/ Home management     Manual therapy 30  Supine ans bilat side lying to back including: stacked dysfunction of EPIL1-5-LV, PEPT5-L5- LV, left LUM and IEL-LV   Neuromuscular Re-education     Therapeutic Activity     Therapeutic Exercises 30 Patient was told to continue doing prone on elbows, and she states this is getting easier. She was also advised to use a lumbar pillow when sitting to support lumbar spine.  Exercises:  Exercise #1: Standing neck and back extension  Comment #1: 1 -2x/hour  Exercise #2: Prone to ARIK  Comment #2: 1 min, 2x/day    Gait training     Modality__________________                Total 55    Blank areas are intentional and mean the treatment did not include these items.       Carlotta Trejo  6/25/2018

## 2021-06-18 NOTE — PROGRESS NOTES
[facility] Daily Progress     Patient Name: Susan Contreras  Date: 6/18/2018  Visit #: 3/12  Referral Diagnosis: Low Back Pain  Referring provider: Jerod Schaeffer MD  Visit Diagnosis:     ICD-10-CM    1. Lower Back Pain M54.5    2. Osteoarthritis M19.90          Assessment:     Patient is benefitting from skilled physical therapy and is making steady progress toward functional goals.    Goal Status:  Patient will twist/turn : for cooking/cleaning;for bed mobilitiy;with less difficulty;in 12 weeks;Comment  Comment:: with 0-5/10 LBP: current status is 1-10/10, generally 0-5/10 in the last week.  Pt will: lift objects to 25# including groceries with 0-5/10 LBP in 12 weeks: current status is that this is still very hard to do,- 6-7/10, and sore the day after doing it. Even milk is still hard to carry.  Pt will: have resting LBP 0-3/10 in 12 weeks: Current status is 0-1/10, better       Plan / Patient Education:     Continue with initial plan of care.   Assess prone Trunk extension for LBP    Subjective:     Pain Rating: 3   Groin area pain- centrally and right LB.  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 The [employer] A/[employer] was out and I was just hot today.  You took away my left side of the neck pain which has been there for 2 month- after last PT it was just gone!    Objective:   PSIS level  Marked LV, Artrial and visceral fascial tightness of trunk    Treatment Today     TREATMENT MINUTES COMMENTS   Evaluation     Self-care/ Home management     Manual therapy 45 [place] MT/SCS to neck, chest and abdomen/pelvis including: Left BRANDI-LV, R BCT-V, R INOM-LV, R LGI-LV, IVC-V, CECL-V, R MUL-V, Bilat MR-V, LOT-V, Stacke EPIL1-5-LV   Neuromuscular Re-education     Therapeutic Activity     Therapeutic Exercises 10 Added ARIK  Exercises:  Exercise #1: Standing neck and back extension  Comment #1: 1  -2x/hour  Exercise #2: Prone to ARIK  Comment #2: 1 min, 2x/day    Gait training     Modality__________________                Total 55    Blank areas are intentional and mean the treatment did not include these items.       Carlotta Trejo  6/18/2018

## 2021-06-20 NOTE — PROGRESS NOTES
[facility] Re-Certification Request    August 22, 2018      Patient: Susan Contreras  MR Number: [id number]  YOB: 1950  Date of Visit: 8/22/2018  Onset Date:  3/23/18  Date of Eval: 4/12/18    Dear Dr. Jerod Schaeffer:    As you may recall, we have been seeing Susan Contreras for Physical Therapy of LBP.    For therapy to continue, Medicare and/or Medicaid requires periodic physician review of the treatment plan. Please review the summary of the patient's progress and our plan for continued therapy, and verify  that you agree therapy should continue by entering certification dates at the bottom of this note and co-signing this note.    Plan of Care  Authorization / Certification Start Date: 07/23/18  Authorization / Certification End Date: 10/23/18  Authorization / Certification Number of Visits: 6  Communication with: Referral Source  Patient Related Instruction: Nature of Condition;Treatment plan and rationale;Self Care instruction;Basis of treatment;Body mechanics;Posture;Precautions;Expected outcome  Times per Week: 1-2x/month  Number of Weeks: 12  Number of Visits: 6  Discharge Planning: Home exercises and self management  Precautions / Restrictions : None known  Therapeutic Exercise: ROM;Stretching;Strengthening  Neuromuscular Reeducation: kinesio tape;posture;core  Manual Therapy: soft tissue mobilization;myofascial release;strain counterstrain  Functional Training (ADL's): self care;ergonomics;ADL's    Goal  Patient will twist/turn : for cooking/cleaning;for bed mobilitiy;with less difficulty;in 12 weeks;Comment  Comment:: with 0-5/10 LBP: current status is this is still very difficult if done moderately, and she feels her back may go out- 5-9/10  Pt will: lift objects to 25 # including groceries with 0-5/10 LBP in 12 weeks: current status is this is hard to do and she is 6-7/10  and sore the day after. Milk is very hard to carry  Pt will: have resting LBP 0-3/10 in 12 weeks,  current status is 1-2/10 overall and better      If you have any questions or concerns, please don't hesitate to call.    Sincerely,      Carlotta Trejo, PT        Physician recommendation:     ___ Follow therapist's recommendation        ___ Modify therapy      *Physician co-signature indicates they certify the need for these services furnished within this plan and while under their care.    [facility] Daily Progress/Monthly Summary     Patient Name: Susan Contreras  Date: 8/22/2018  Visit #: 5/12   Referral Diagnosis: Low Back Pain  Referring provider: Jerod Schaeffer MD  Visit Diagnosis:     ICD-10-CM    1. Lower Back Pain M54.5    2. Osteoarthritis M19.90          Assessment:   Patient is overall doing better with her LB, but still feels twisting and lifting still increases LBP above 5/10. She notes that she always feels better in warm weather/summer, but in winter has increased LBP.  She has actually improved her Oswestry scores and activity level, and we wish to continue PT 1x/month to assess Oswestry and function, modify exercises, and treat any back dysfunctions before she begins to limit activity as we transition to winter.  Patient is benefitting from skilled physical therapy and is making steady progress toward functional goals.    Goal Status:  Patient will twist/turn : for cooking/cleaning;for bed mobilitiy;with less difficulty;in 12 weeks;Comment  Comment:: with 0-5/10 LBP: current status is this is still very difficult if done moderately, and she feels her back may go out- 5-9/10  Pt will: lift objects to 25 # including groceries with 0-5/10 LBP in 12 weeks: current status is this is hard to do and she is 6-7/10  and sore the day after. Milk is very hard to carry  Pt will: have resting LBP 0-3/10 in 12 weeks, current status is 1-2/10 overall and better     Patient feels she is overall doing better, but partially this is due to summer heat and being less sedentary.  She has changed her  monthly garbage collection to be weekly (was monthly) and using a smaller container which really helps. We did discuss going weekly for groceries instead of monthly to reduce amount of lifting when she does carry in groceries.    Plan / Patient Education:     Continue with initial plan of care.   Patient is in agreement with plan to continue PT 1x/month for the next 2-3 months as outlined above.    Subjective:     Pain Ratin [place] of the back was stiff this morning. I have been sitting on many different chairs ( park benches, etc) the past 2 weeks due to a death in the family and a reunion. I have also been doing a lot of walking.    2 weeks ago I had a weird pain that went into my groin (groin pain happens off and on) and also into my right lower abdomen.      Objective:     Modified Oswestry Low Back Pain Disablity Questionnaire  in %: 26 (was 38% 3 months ago)   Trunk flexion 5 1/2 inches fingertips to floor ( 1 inch more than in April)    Treatment Today     TREATMENT MINUTES COMMENTS   Evaluation     Self-care/ Home management     Manual therapy 55 Supine MT/SCS to back and abdomen including: Left ILM-V, Bilat MR-V, R SCLUN-N, L MCLUN-N   Neuromuscular Re-education     Therapeutic Activity     Therapeutic Exercises     Gait training     Modality__________________                Total 55    Blank areas are intentional and mean the treatment did not include these items.       Carlotta Trejo  2018

## 2021-06-20 NOTE — PROGRESS NOTES
Assessment and Plan:       Susan was seen today for annual wellness visit, immunizations, concerns, numbness, fatigue, knee pain, obesity, back pain, ankle pain, concerns and anxiety.    Routine general medical examination at a health care facility    Fatigue  -     HM2(CBC w/o Differential)  -     Thyroid [place]    Weight gain  -     Thyroid [place]    Other orders  -     Tdap vaccine,  8yo or older,  IM        The patient's current medical problems were reviewed.    I have had an Advance Directives discussion with the patient.  The following health maintenance schedule was reviewed with the patient and provided in printed form in the after visit summary:   Health Maintenance   Topic Date Due     ZOSTER VACCINE  05/05/2011     ADVANCE DIRECTIVES DISCUSSED WITH PATIENT  02/04/2016     DXA SCAN  05/05/2016     PNEUMOCOCCAL POLYSACCHARIDE VACCINE AGE 65 AND OVER  05/05/2016     PNEUMOCOCCAL CONJUGATE VACCINE FOR ADULTS (PCV13 OR PREVNAR)  05/05/2016     INFLUENZA VACCINE RULE BASED (1) 08/01/2018     FALL RISK ASSESSMENT  09/04/2019     MAMMOGRAM  04/06/2020     COLONOSCOPY  05/21/2023     TD 18+ HE  09/04/2028        Subjective:   Chief Complaint: Susan Contreras is an 68 y.o. female here for an Annual Wellness visit.   HPI: She brings with her today a list of 2 pages of questions she would like to ask.  These included such considerations as intermittent numbness, fatigue, knee pain, weight management, management of back pain, management of ankle pain, occasional bouts of pain after eating on the left side which are rare, anxiety and stress management.  We worked through all of her concerns.  None of her concerns were indicative of a significant health process.  This was a lengthy conversation to address all of her concerns today but again as stated no significant problems identified.  Reviewed routine health information as noted.  She has hyperlipidemia.  At risk for diabetes and sleep apnea.  Does not  report any sleep apnea concerns.  Has multiple musculoskeletal pain considerations.  Has done physical therapy and done well.  His family history of colon cancer.  Underwent a colonoscopy this past May.  Mammogram completed in April.  Reviewed immunizations.    Review of Systems:   Please see above.  The rest of the review of systems are negative for all systems.    Patient Care Team:  Jerod Schaeffer MD as PCP - General     Patient Active Problem List   Diagnosis     Hyperhidrosis     Dysfunctional Uterine Bleeding     Arthritis     Hyperglycemia     Lower Back Pain     Headache     Esophageal Reflux     Lump In / On The Skin      Migraine     Osteoarthritis     Benign neoplasm of colon     Benign neoplasm of sigmoid colon     Special screening for malignant neoplasms, colon     Diverticular disease of colon     Colorectal polyps     Diverticular disease of large intestine     Diverticulosis large intestine w/o perforation or abscess w/o bleeding     Diverticulosis of colon     Encounter for screening for malignant neoplasm of colon     Family history of malignant neoplasm of gastrointestinal tract     Neoplasm of uncertain behavior of stomach, intestines, and rectum     Polyp of colon     History of colonic polyps     Hemorrhoids     Past Medical History:   Diagnosis Date     Arthritis       Past Surgical History:   Procedure Laterality Date     OK DILATION/CURETTAGE,DIAGNOSTIC      Description: Dilation And Curettage;  Recorded: 07/08/2014;     OK REMOVAL OF TONSILS,<11 Y/O      Description: Tonsillectomy;  Recorded: 07/08/2014;     SHOULDER ARTHROSCOPY  1982      Family History   Problem Relation Age of Onset     Heart disease Mother      Lymphoma Mother      Diabetes Father       Social History     Social History     Marital status:      Spouse name: N/A     Number of children: N/A     Years of education: N/A     Occupational History     Not on file.     Social History Main Topics     Smoking status:  "Never Smoker     Smokeless tobacco: Never Used     Alcohol use Not on file     Drug use: Not on file     Sexual activity: Not on file     Other Topics Concern     Not on file     Social History Narrative      Current Outpatient Prescriptions   Medication Sig Dispense Refill     ibuprofen (ADVIL,MOTRIN) 200 MG tablet Take 200 mg by mouth every 6 (six) hours as needed for pain.       No current facility-administered medications for this visit.       Objective:   Vital Signs:   Visit Vitals     /74     Pulse 74     Resp 16     Ht 5' 5\" (1.651 m)     Wt (!) 236 lb (107 kg)     SpO2 99%     BMI 39.27 kg/m2        VisionScreening:  No exam data present     PHYSICAL EXAM      General Appearance:    Alert, cooperative, no distress, appears stated age   Eyes:   No scleral icterus or conjunctival irritation       Ears:    Normal TM's and external ear canals, both ears   Throat:   Lips, mucosa, and tongue normal; teeth and gums normal   Neck:   Supple, symmetrical, trachea midline, no adenopathy;        thyroid:  No enlargement/tenderness/nodules   Lungs:     Clear to auscultation bilaterally, respirations unlabored, no wheezes or crackles   Heart:    Regular rate and rhythm,  no murmur, rub   or gallop   Abdomen:     Soft, non-tender, bowel sounds active,     no masses, no organomegaly     Extremities:   Extremities normal, atraumatic, no cyanosis or edema   Skin:   Skin color, texture, turgor normal, no rashes or lesions   Neurologic:   Normal strength and sensation        throughout on gross examination.         Assessment Results 9/4/2018   Activities of Daily Living No help needed   Instrumental Activities of Daily Living No help needed   Get Up and Go Score Less than 12 seconds   Mini Cog Total Score 5   Some recent data might be hidden     A Mini-Cog score of 0-2 suggests the possibility of dementia, score of 3-5 suggests no dementia    Identified Health Risks:     The patient was provided with suggestions to help " her develop a healthy lifestyle.   She is at risk for lack of exercise and has been provided with information to increase physical activity for the benefit of her well-being.  The patient was counseled and encouraged to consider modifying their diet and eating habits. She was provided with information on recommended healthy diet options.  She is at risk for falling and has been provided with information to reduce the risk of falling at home.  Information regarding advance directives (living [place]les), including where she can download the appropriate form, was provided to the patient via the AVS.

## 2021-06-20 NOTE — PROGRESS NOTES
[facility] Daily Progress /Monthly Summary    Patient Name: Susan Contreras  Date: 2018  Visit #:   Referral Diagnosis: Low Back Pain  Referring provider: Jerod Schaeffer MD  Visit Diagnosis:     ICD-10-CM    1. Lower Back Pain M54.5    2. Osteoarthritis M19.90          Assessment:   Patient is overall better in her LBP, but still very easy to trigger symptom increase. Patient is not really compliant with her exercise, and we did spend time telling her general ideas to treat her pain including changing positions and reversing spine postures as well as using a lumbar roll when sitting.  Patient is benefitting from skilled physical therapy and is making steady progress toward functional goals.    Goal Status:  Patient will twist/turn : for cooking/cleaning;for bed mobilitiy;with less difficulty;in 12 weeks;Comment  Comment:: with 0-5/10 LBP: current status is this is variable and she can still feel like back is unstable 5-9/10  Pt will: lift ogjects to 25 # including groceries with 0-5/10 LBP in 12 weeks, current status if that she can increase with very little activity such as one squat to 6-7/10 and sore the day after. Mild is hard to carry in.  Pt will: Have resting LBP 0-3/10 in 12 weeks: current status is 1-2/10 overall and better    Plan / Patient Education:     Continue with initial plan of care.    Subjective:     Pain Ratin  Last week and the weekend was not as good in my LB, and it felt worse- 3/10 from the waist to the neck.    Squatting at the doctors office caused me instant pain in my back, but it was better 24 hours later.    In general, sitting is the most painful for my LB and it is hard to straighten up aftet sitting.      Objective:     Modified Oswestry Low Back Pain Disablity Questionnaire  in %: 36 (was 26% 3 weeks ago)    strength: right 40# (nl is 50#), 2nd attempt 52 #, Left 60# ( nl 41#)- severe DJD of right thumb MCP  Pt reports she has tingling in all 4  right fingertips which is off and on. Tight pectoral minor is tight to palpation  Trunk flexion is 7 1/4 inches fingertips to the floor (was 5 1/2 inches last month)- after treatment 4 1/2 inches.    Treatment Today     TREATMENT MINUTES COMMENTS   Evaluation     Self-care/ Home management     Manual therapy 25 Supine MT/SCS to back and abdomen including Stacked ALL-N L1-CX2, Bilat MR-V, and Left ILM-V. After treatment trunk flexion increased to 4 1/2 inches   Neuromuscular Re-education     Therapeutic Activity     Therapeutic Exercises 30 Did review exercises for her back, discuss stretching of tight pectoral to decrease right finger tingling, discuss back symptoms and review current back exercises- adding supine trunk rotation.  We also did discuss use of lumbar roll when in sitting  Exercises:  Exercise #1: Standing neck and back extension  Comment #1: 1 -2x/hour  Exercise #2: Prone to ARIK  Comment #2: 1 min, 2x/day  Exercise #3: Chest/Pectoral stretch  Comment #3: Daily - with wrist extension  Exercise #4: Supine Trunk rotation  Comment #4: Daily    Gait training     Modality__________________                Total 55    Blank areas are intentional and mean the treatment did not include these items.       Carlotta Trejo  9/17/2018

## 2021-06-21 ENCOUNTER — HOSPITAL ENCOUNTER (OUTPATIENT)
Dept: MAMMOGRAPHY | Facility: CLINIC | Age: 71
Discharge: HOME | End: 2021-06-21
Attending: FAMILY MEDICINE
Payer: MEDICARE

## 2021-06-21 DIAGNOSIS — Z12.31 VISIT FOR SCREENING MAMMOGRAM: ICD-10-CM

## 2021-06-21 NOTE — PROGRESS NOTES
[facility] Daily Progress/Monthly Summary     Patient Name: Susan Contreras  Date: 10/17/2018  Visit #:    Referral Diagnosis: Low Back Pain  Referring provider: Jerod Schaeffer MD  Visit Diagnosis:     ICD-10-CM    1. Lower Back Pain M54.5    2. Osteoarthritis M19.90          Assessment:     Patient is overall doing better with her LBP, but when she increases her activity she does get increased symptoms. Testing today show a limitation in hip and trunk flexion in supine which could interfere with deep squats, as well a remarkable abdominal bracing which could leave lumbar stability very decreased during activities.    Goal Status:  Patient will twist/turn : for cooking/cleaning;for bed mobilitiy;with less difficulty;in 12 weeks;Comment  Comment:: with 0-5/10 LBP: current status is this is variable from 2-4/10, and is difficutl  Pt will: lift ogjects to 25 # including groceries with 0-5/10 LBP in 12 weeks, current status if that she can increase LBP with prolonged or repetitive bending  Pt will: Have resting LBP 0-3/10 in 12 weeks: current status is 1-2/10 overall and better    Plan / Patient Education:     Continue with initial plan of care.   Check deep squat ability, and recheck abdominal SLR strength    Subjective:     Pain Ratin LB  The extension exercises help me a lot with my LBP  I was ill earlier in the weak and possibly from what I ate. But I felt chills, and still am not able to eat without diarrhea.  I do have acid reflux also.  I know I have lower abdominal pain off and on the past 4 days.  My low back was horrible last week due to bending and stooping to wrap a large 30 x 40 inch barn picture, bending and lifting to bring it to [employer], and helping my sister with her 3 day garage sale. The pain increased to 9/10, and resting 4 hours in recliner decreased my back pain down to 3/10. By 24 hours later it was resolved.      Objective:     Trunk flexion is 8 inches fingertips to  floor  Patient had mild thoracic kyphosis.   Bilat hip flexion is 115 degrees with left anterior abdominal and rib pain    Abdominal strength is very poor- 2-/5 today, and patient only able to do 1-3 reps of SLR with good lumbar control of lordosis on both legs    Treatment Today     TREATMENT MINUTES COMMENTS   Evaluation     Self-care/ Home management     Manual therapy     Neuromuscular Re-education     Therapeutic Activity     Therapeutic Exercises 55 Discussed and tried different exercises such as thoracic extension stretches in supine using a ball or a swim noodle, happy baby pose for lumbar flexion, and abdominal bracing and bracing with SLR.   Exercises:  Exercise #1: Standing neck and back extension  Comment #1: 1 -2x/hour  Exercise #2: Prone to ARIK  Comment #2: 1 min, 2x/day  Exercise #3: Chest/Pectoral stretch  Comment #3: Daily - with wrist extension  Exercise #4: Supine Trunk rotation  Comment #4: Daily  Exercise #5: Abdominal bracing with SLR, both legs  Comment #5: 5 reps, 2x/week    Gait training     Modality__________________                Total 55    Blank areas are intentional and mean the treatment did not include these items.       Carlotta Trejo  10/17/2018

## 2021-06-21 NOTE — PROGRESS NOTES
Patient ID: Susan Contreras is a 68 y.o. female.  /78  Pulse 76  Wt (!) 237 lb (107.5 kg)  BMI 39.44 kg/m2    Assessment/Plan:                   Diagnoses and all orders for this visit:    Sebaceous cyst          DISCUSSION  Overall provided reassurance.  There is a small cystic structure that actually seems to have an opening to the skin surface.  No material was expressed today.  There is no evidence of inflammation or other concern currently.  Provided reassurance overall.  Discussed possibility for consideration of removal of the cyst at some point she will contemplate this further.  We will try to keep the area moist and relatively free of other things to prevent this from getting worse.  Subjective:     HPI    Susan Contreras is a 68 y.o. female she is here today to discuss management of a cyst of the skin.  Patient has had a recurring cyst in the left axillary region.  Patient reports that a number of years ago she had to have this incised and drained.  States small for a long time.  About 4 or 5 years ago it did become inflamed and enlarged.  It was incised and drained again.  A culture was obtained that showed usual declan.  Patient reports it does not cause much trouble up until recently.  He became somewhat inflamed.  She schedule an appointment to have it evaluated but has since decreased in size.  Patient continues to report typical findings of a cyst which include a skin lump on the surface of the skin that will become somewhat large.  It will express a thick whitish material when it becomes large.  It is occasionally painful it would then usually return to a more normal size.  At the current time the cyst is actually quite small.    Review of Systems  Complete review of systems is obtained.  Other than the specific considerations noted above complete review of systems is negative.          Objective:   Medications:  Current Outpatient Prescriptions   Medication Sig     ibuprofen  (ADVIL,MOTRIN) 200 MG tablet Take 200 mg by mouth every 6 (six) hours as needed for pain.       Allergies:  Allergies   Allergen Reactions     Acetaminophen Unknown     Codeine Nausea And Vomiting     House Dust      Hydrocodone Bitartrate Unknown     Hydrocodone-Acetaminophen      Abdominal pain       Latex Unknown       Tobacco:   reports that she has never smoked. She has never used smokeless tobacco.     Physical Exam          /78  Pulse 76  Wt (!) 237 lb (107.5 kg)  BMI 39.44 kg/m2        General: Patient alert no signs of distress    Skin: Small cystic structure in the left side of the axilla is located in the more inferior central portion of the axillary region.  It is smooth round freely mobile nontender or inflamed at this time.

## 2021-06-22 NOTE — PROGRESS NOTES
[facility] Discharge Summary  Patient Name: Susan Contreras  Date: 11/30/2018  Referral Diagnosis: Low Back Pain  Referring provider: Jerod Schaeffer MD  Visit Diagnosis:   1. Lower Back Pain     2. Osteoarthritis         Goals:  Patient will twist/turn : for cooking/cleaning;for bed mobilitiy;with less difficulty;in 12 weeks;Comment  Comment:: with 0-5/10 LBP: current status is this is variable from 2-4/10, and is difficult    Pt will: lift objects to 25 # including groceries with 0-5/10 LBP in 12 weeks, current status if that she can increase LBP with prolonged or repetitive bending  Pt will: Have resting LBP 0-3/10 in 12 weeks: current status is 1-2/10 overall and better      Patient was seen for 7 visits from 4/23/18 to 10/17/18 with 2 missed appointments.  Patient is overall doing better with her LBP, but when she increases her activity she does get increased symptoms. Testing today show a limitation in hip and trunk flexion in supine which could interfere with deep squats, as well a remarkable abdominal bracing which could leave lumbar stability very decreased during activities.    Patient has no further PT scheduled, and likely has chosen to stop PT- she does have exercises to perform for maintenance of back flexibility and strength at home    Therapy will be discontinued at this time.  The patient will need a new referral to resume.    Thank you for your referral.  Carlotta Trejo  11/30/2018  5:02 PM

## 2021-06-28 NOTE — PROGRESS NOTES
Progress Notes by Cristian Price DO at 12/22/2019  8:00 AM     Author: Cristian Price DO Service: -- Author Type: Physician    Filed: 12/22/2019  9:09 AM Encounter Date: 12/22/2019 Status: Signed    : Cristian Price DO (Physician)       Chief Complaint   Patient presents with   ? Conjunctivitis     monday woke up with right eye red with pain- went to eye provider the eyes were fine. was diagnosed with pink eye. no meds was given- On tuesday woke up with the left eye red and pain. had some eye matter with itchiness.     History of Present Illness: Rooming staff notes reviewed. Patient started to have some right eye pain about 8 days ago, with redness to right eye 6 days ago. The symptoms spread to the left eye. The condition seems to be improving the past couple days. She had a lot of eye mattering yesterday morning, none today.      Review of systems: See history of present illness, all others negative.     Current Outpatient Medications   Medication Sig Dispense Refill   ? benzonatate (TESSALON) 200 MG capsule Take 1 capsule (200 mg total) by mouth 3 (three) times a day as needed for cough. 21 capsule 0   ? fluticasone propionate (FLONASE) 50 mcg/actuation nasal spray 1 spray each nostril twice daily 16 g 12   ? ibuprofen (ADVIL,MOTRIN) 200 MG tablet Take 200 mg by mouth every 6 (six) hours as needed for pain.     ? tobramycin (TOBREX) 0.3 % ophthalmic solution Use 1 drop every 4 hours to both eyes while awake for 7 days. 5 mL 0     No current facility-administered medications for this visit.      Past Medical History:   Diagnosis Date   ? Arthritis       Past Surgical History:   Procedure Laterality Date   ? MD DILATION/CURETTAGE,DIAGNOSTIC      Description: Dilation And Curettage;  Recorded: 07/08/2014;   ? MD REMOVAL OF TONSILS,<13 Y/O      Description: Tonsillectomy;  Recorded: 07/08/2014;   ? SHOULDER ARTHROSCOPY  1982      Social History     Tobacco Use   ? Smoking status: Never Smoker   ? Smokeless  tobacco: Never Used   Substance Use Topics   ? Alcohol use: Not on file   ? Drug use: Not on file        Family History   Problem Relation Age of Onset   ? Heart disease Mother    ? Lymphoma Mother    ? Diabetes Father        Vitals:    12/22/19 0809   BP: 117/69   Patient Site: Right Arm   Patient Position: Sitting   Cuff Size: Adult Regular   Pulse: 73   Resp: 17   Temp: 97.8  F (36.6  C)   TempSrc: Oral   SpO2: 98%   Weight: 191 lb 6.4 oz (86.8 kg)       EXAM:   General: Vital signs reviewed.  Patient is in no acute appearing distress.  Breathing appears nonlabored.  Patient is alert and oriented ×3.  ENT: Ear exam shows bilateral tympanic membranes to be clear without injection, nasal turbinates show no injection or edema, no pharyngeal injection or exudate.  Eye exam: There is slight scleral injection and palpebral conjunctival injection bilaterally with no eye mattering noted.  Corneas are clear, and pupils are equal and normal size.  No lid or periorbital edema or injection.  Neck: supple with no adenoapthy.  Heart: Heart rate is regular without murmur.  Lungs: Lungs are clear to auscultation with good airflow bilaterally.  Skin: Skin is warm and dry without any rash noted.    Assessment/Plan   1. Acute conjunctivitis of both eyes, unspecified acute conjunctivitis type  tobramycin (TOBREX) 0.3 % ophthalmic solution       Patient Instructions     I recommend you use the antibiotic eye drop treatment if the mattering should recur you noted yesterday. See the handout for treatment advice. I think your eye condition is likely viral in origin, will likely continue to improve without antibiotic treatment.     Patient Education     Conjunctivitis Caused by Infection     Wash hands often to help prevent spreading infection.     Infections are caused by viruses or germs (bacteria). Treatment includes keeping your eyes and hands clean. Your healthcare provider may prescribe eye drops, and tell you to stay home from  work or school if youre contagious. Untreated infections can be serious. It's important to see your provider for a diagnosis.  Viral infections  A cold, flu, or other virus can spread to your eyes. This causes a watery discharge. Your eyes may burn or itch and get red. Your eyelids may also be puffy and sore.  Treatment  Most viral infections go away on their own. Artificial tears and warm compresses can relieve symptoms. Your healthcare provider may also prescribe eye drops. A viral infection can be very contagious and spread quickly. To prevent this, wash your hands often. Use a separate tissue to wipe each eye. Dont touch your eyes or share bedding or towels. Use a new, clean washcloth every day. Throw away eye cosmetics, especially mascara. Never use someone else's eye cosmetics. If you use contact lenses, follow your healthcare provider's instructions on proper lens care.   Bacterial infections  Bacterial infections often happen in one eye. There may be a watery or a thick discharge from the eye. These infections can cause serious damage to your eye if not treated promptly.  Treatment  Your healthcare provider may prescribe eye drops or ointment to kill the bacteria. Use the medicine for the number of days it is prescribed. Don't stop using it when the symptoms improve. Warm compresses can help keep the eyelids clean. To keep the bacteria from spreading, wash your hands often. Use a separate tissue to wipe each eye. Don't touch your eyes or share bedding or towels. Use a new, clean washcloth every day. Throw away eye cosmetics, especially mascara. Never use someone else's eye cosmetics. If you use contact lenses, follow your healthcare provider's instructions on proper lens care.   Date Last Reviewed: 10/1/2017    2607-4509 [employer]. [address] [place] [address], [place] [address]. All rights reserved. This information is not intended as a substitute for professional medical care. Always follow your  healthcare professional's instructions.              Cristian Price DO

## 2021-07-21 ENCOUNTER — RECORDS - HEALTHEAST (OUTPATIENT)
Dept: ADMINISTRATIVE | Facility: CLINIC | Age: 71
End: 2021-07-21

## 2021-08-03 ENCOUNTER — ANCILLARY PROCEDURE (OUTPATIENT)
Dept: BONE DENSITY | Facility: CLINIC | Age: 71
End: 2021-08-03
Attending: FAMILY MEDICINE

## 2021-08-03 DIAGNOSIS — N95.8 OTHER SPECIFIED MENOPAUSAL AND PERIMENOPAUSAL DISORDERS: ICD-10-CM

## 2021-08-03 DIAGNOSIS — Z13.820 SCREENING FOR OSTEOPOROSIS: ICD-10-CM

## 2021-08-03 PROCEDURE — 77080 DXA BONE DENSITY AXIAL: CPT | Mod: 26 | Performed by: INTERNAL MEDICINE

## 2021-08-15 ENCOUNTER — HEALTH MAINTENANCE LETTER (OUTPATIENT)
Age: 71
End: 2021-08-15

## 2021-08-23 ENCOUNTER — TRANSFERRED RECORDS (OUTPATIENT)
Dept: HEALTH INFORMATION MANAGEMENT | Facility: CLINIC | Age: 71
End: 2021-08-23

## 2021-10-11 ENCOUNTER — HEALTH MAINTENANCE LETTER (OUTPATIENT)
Age: 71
End: 2021-10-11

## 2021-11-17 ENCOUNTER — IMMUNIZATION (OUTPATIENT)
Dept: NURSING | Facility: CLINIC | Age: 71
End: 2021-11-17
Payer: MEDICARE

## 2021-11-17 PROCEDURE — 91300 PR COVID VAC PFIZER DIL RECON 30 MCG/0.3 ML IM: CPT

## 2021-11-17 PROCEDURE — 0004A PR COVID VAC PFIZER DIL RECON 30 MCG/0.3 ML IM: CPT

## 2022-05-10 ENCOUNTER — TELEPHONE (OUTPATIENT)
Dept: PODIATRY | Facility: CLINIC | Age: 72
End: 2022-05-10

## 2022-05-10 ENCOUNTER — OFFICE VISIT (OUTPATIENT)
Dept: PODIATRY | Facility: CLINIC | Age: 72
End: 2022-05-10
Payer: MEDICARE

## 2022-05-10 VITALS — OXYGEN SATURATION: 98 % | WEIGHT: 232 LBS | HEIGHT: 65 IN | HEART RATE: 75 BPM | BODY MASS INDEX: 38.65 KG/M2

## 2022-05-10 DIAGNOSIS — M20.10 HALLUX VALGUS, UNSPECIFIED LATERALITY: ICD-10-CM

## 2022-05-10 DIAGNOSIS — M21.6X2 PRONATION DEFORMITY OF BOTH FEET: Primary | ICD-10-CM

## 2022-05-10 DIAGNOSIS — M20.41 HAMMER TOE OF RIGHT FOOT: ICD-10-CM

## 2022-05-10 DIAGNOSIS — M21.6X1 PRONATION DEFORMITY OF BOTH FEET: Primary | ICD-10-CM

## 2022-05-10 PROCEDURE — 99203 OFFICE O/P NEW LOW 30 MIN: CPT | Performed by: PODIATRIST

## 2022-05-10 ASSESSMENT — PAIN SCALES - GENERAL: PAINLEVEL: SEVERE PAIN (6)

## 2022-05-10 NOTE — PROGRESS NOTES
FOOT AND ANKLE SURGERY/PODIATRY CONSULT NOTE         ASSESSMENT:   Hammertoe second toe right foot  Hallux abductovalgus bilaterally  Pronation deformity bilateral feet      TREATMENT:  I informed the patient she would require surgical correction of her bilateral foot deformities.  The patient stated she was not interested in having any surgical correction at this time.  She would like a new pair of orthotics to control her flatfeet.  She is to return to the clinic as needed.        HPI:Susan Contreras presented to the clinic today complaining of mild pain on the bottom of both feet.  The patient indicated that she has had a long history of wearing orthotics to manage her flat feet.  She has not had a new pair of orthotics for the past 8 years.  Her pain is mild in nature.  She is able to wear most shoes without too much discomfort.  The patient stated she does have a very painful second toe right foot.  She is aware she has a hammertoe.  She has some strapping that she wears to hold the toe down.  She stated this does work for several days.  She describes it as a achy type pain.  The pain is relieved after removing her shoe gear.  She stated that she also has some moderate pain involving bunions of both feet.  Certain shoes aggravate her bunions.  When she is sitting and resting she has no pain.  She denies any recent trauma to her feet.  She has not had any associated redness or swelling.    Past Medical History:   Diagnosis Date     Arthritis        Social History     Socioeconomic History     Marital status:      Spouse name: Not on file     Number of children: Not on file     Years of education: Not on file     Highest education level: Not on file   Occupational History     Not on file   Tobacco Use     Smoking status: Never Smoker     Smokeless tobacco: Never Used   Substance and Sexual Activity     Alcohol use: Not on file     Drug use: Not on file     Sexual activity: Not on file   Other Topics  Concern     Not on file   Social History Narrative     Not on file     Social Determinants of Health     Financial Resource Strain: Not on file   Food Insecurity: Not on file   Transportation Needs: Not on file   Physical Activity: Not on file   Stress: Not on file   Social Connections: Not on file   Intimate Partner Violence: Not on file   Housing Stability: Not on file          Allergies   Allergen Reactions     Acetaminophen Unknown     Codeine Nausea and Vomiting     House Dust [Dust Mite Extract] Unknown     Hydrocodone Bitartrate [Hydrocodone] Unknown     Hydrocodone-Acetaminophen Unknown     Abdominal pain       Latex Unknown        No current outpatient medications on file.     Family History   Problem Relation Age of Onset     Heart Disease Mother      Lymphoma Mother      Diabetes Father         Social History     Socioeconomic History     Marital status:      Spouse name: Not on file     Number of children: Not on file     Years of education: Not on file     Highest education level: Not on file   Occupational History     Not on file   Tobacco Use     Smoking status: Never Smoker     Smokeless tobacco: Never Used   Substance and Sexual Activity     Alcohol use: Not on file     Drug use: Not on file     Sexual activity: Not on file   Other Topics Concern     Not on file   Social History Narrative     Not on file     Social Determinants of Health     Financial Resource Strain: Not on file   Food Insecurity: Not on file   Transportation Needs: Not on file   Physical Activity: Not on file   Stress: Not on file   Social Connections: Not on file   Intimate Partner Violence: Not on file   Housing Stability: Not on file        Review of Systems - Patient denies fever, chills, rash, wound, stiffness, limping, numbness, weakness, heart burn, blood in stool, chest pain with activity, calf pain when walking, shortness of breath with activity, chronic cough, easy bleeding/bruising, swelling of ankles, excessive  thirst, fatigue, depression, anxiety.  Patient admits to bilateral foot pain and bilateral flatfeet.      OBJECTIVE:  Appearance: alert, well appearing, and in no distress.    There were no vitals taken for this visit.     There is no height or weight on file to calculate BMI.     General appearance: Patient is alert and fully cooperative with history & exam.  No sign of distress is noted during the visit.  Psychiatric: Affect is pleasant & appropriate.  Patient appears motivated to improve health.  Respiratory: Breathing is regular & unlabored while sitting.  HEENT: Hearing is intact to spoken word.  Speech is clear.  No gross evidence of visual impairment that would impact ambulation.    Vascular: Dorsalis pedis and posterior tibial pulses are palpable. There is pedal hair growth bilaterally.  CFT < 3 sec from anterior tibial surface to distal digits bilaterally. There is no appreciable edema noted.  Dermatologic: Turgor and texture are within normal limits. No coloration or temperature changes. No primary or secondary lesions noted.  Neurologic: All epicritic and proprioceptive sensations are grossly intact bilaterally.  Musculoskeletal: All active and passive ankle, subtalar, midtarsal, and 1st MPJ range of motion are grossly intact without pain or crepitus, with the exception of none. Manual muscle strength is within normal limits bilaterally. All dorsiflexors, plantarflexors, invertors, evertors are intact bilaterally. Tenderness present to the second toe right foot on palpation. Tenderness to the second toe right foot with range of motion.  There is a rigid flexion deformity at the proximal interphalangeal joint second toe right foot.  There is a large firm palpable subcutaneous mass on the medial aspect of the head of the first metatarsal bilaterally.  There is severe flattening of the medial longitudinal arch noted bilaterally.  Calf is soft/non-tender without warmth/induration    Imaging:       No images  are attached to the encounter or orders placed in the encounter.     No results found.   No results found.         Jelani Reyes DPM  [facility]

## 2022-05-10 NOTE — LETTER
5/10/2022         RE: Susan Contreras  [address] [place] [address]        Dear Colleague,    Thank you for referring your patient, Susan Contreras, to the [facility]. Please see a copy of my visit note below.    FOOT AND ANKLE SURGERY/PODIATRY CONSULT NOTE         ASSESSMENT:   Hammertoe second toe right foot  Hallux abductovalgus bilaterally  Pronation deformity bilateral feet      TREATMENT:  I informed the patient she would require surgical correction of her bilateral foot deformities.  The patient stated she was not interested in having any surgical correction at this time.  She would like a new pair of orthotics to control her flatfeet.  She is to return to the clinic as needed.        HPI:Susan Contreras presented to the clinic today complaining of mild pain on the bottom of both feet.  The patient indicated that she has had a long history of wearing orthotics to manage her flat feet.  She has not had a new pair of orthotics for the past 8 years.  Her pain is mild in nature.  She is able to wear most shoes without too much discomfort.  The patient stated she does have a very painful second toe right foot.  She is aware she has a hammertoe.  She has some strapping that she wears to hold the toe down.  She stated this does work for several days.  She describes it as a achy type pain.  The pain is relieved after removing her shoe gear.  She stated that she also has some moderate pain involving bunions of both feet.  Certain shoes aggravate her bunions.  When she is sitting and resting she has no pain.  She denies any recent trauma to her feet.  She has not had any associated redness or swelling.    Past Medical History:   Diagnosis Date     Arthritis        Social History     Socioeconomic History     Marital status:      Spouse name: Not on file     Number of children: Not on file     Years of education: Not on file     Highest education level: Not on file    Occupational History     Not on file   Tobacco Use     Smoking status: Never Smoker     Smokeless tobacco: Never Used   Substance and Sexual Activity     Alcohol use: Not on file     Drug use: Not on file     Sexual activity: Not on file   Other Topics Concern     Not on file   Social History Narrative     Not on file     Social Determinants of Health     Financial Resource Strain: Not on file   Food Insecurity: Not on file   Transportation Needs: Not on file   Physical Activity: Not on file   Stress: Not on file   Social Connections: Not on file   Intimate Partner Violence: Not on file   Housing Stability: Not on file          Allergies   Allergen Reactions     Acetaminophen Unknown     Codeine Nausea and Vomiting     House Dust [Dust Mite Extract] Unknown     Hydrocodone Bitartrate [Hydrocodone] Unknown     Hydrocodone-Acetaminophen Unknown     Abdominal pain       Latex Unknown        No current outpatient medications on file.     Family History   Problem Relation Age of Onset     Heart Disease Mother      Lymphoma Mother      Diabetes Father         Social History     Socioeconomic History     Marital status:      Spouse name: Not on file     Number of children: Not on file     Years of education: Not on file     Highest education level: Not on file   Occupational History     Not on file   Tobacco Use     Smoking status: Never Smoker     Smokeless tobacco: Never Used   Substance and Sexual Activity     Alcohol use: Not on file     Drug use: Not on file     Sexual activity: Not on file   Other Topics Concern     Not on file   Social History Narrative     Not on file     Social Determinants of Health     Financial Resource Strain: Not on file   Food Insecurity: Not on file   Transportation Needs: Not on file   Physical Activity: Not on file   Stress: Not on file   Social Connections: Not on file   Intimate Partner Violence: Not on file   Housing Stability: Not on file        Review of Systems - Patient  denies fever, chills, rash, wound, stiffness, limping, numbness, weakness, heart burn, blood in stool, chest pain with activity, calf pain when walking, shortness of breath with activity, chronic cough, easy bleeding/bruising, swelling of ankles, excessive thirst, fatigue, depression, anxiety.  Patient admits to bilateral foot pain and bilateral flatfeet.      OBJECTIVE:  Appearance: alert, well appearing, and in no distress.    There were no vitals taken for this visit.     There is no height or weight on file to calculate BMI.     General appearance: Patient is alert and fully cooperative with history & exam.  No sign of distress is noted during the visit.  Psychiatric: Affect is pleasant & appropriate.  Patient appears motivated to improve health.  Respiratory: Breathing is regular & unlabored while sitting.  HEENT: Hearing is intact to spoken word.  Speech is clear.  No gross evidence of visual impairment that would impact ambulation.    Vascular: Dorsalis pedis and posterior tibial pulses are palpable. There is pedal hair growth bilaterally.  CFT < 3 sec from anterior tibial surface to distal digits bilaterally. There is no appreciable edema noted.  Dermatologic: Turgor and texture are within normal limits. No coloration or temperature changes. No primary or secondary lesions noted.  Neurologic: All epicritic and proprioceptive sensations are grossly intact bilaterally.  Musculoskeletal: All active and passive ankle, subtalar, midtarsal, and 1st MPJ range of motion are grossly intact without pain or crepitus, with the exception of none. Manual muscle strength is within normal limits bilaterally. All dorsiflexors, plantarflexors, invertors, evertors are intact bilaterally. Tenderness present to the second toe right foot on palpation. Tenderness to the second toe right foot with range of motion.  There is a rigid flexion deformity at the proximal interphalangeal joint second toe right foot.  There is a large firm  palpable subcutaneous mass on the medial aspect of the head of the first metatarsal bilaterally.  There is severe flattening of the medial longitudinal arch noted bilaterally.  Calf is soft/non-tender without warmth/induration    Imaging:       No images are attached to the encounter or orders placed in the encounter.     No results found.   No results found.         Jelani Reyes DPM  [facility] Foot & Ankle Surgery/Podiatry         Again, thank you for allowing me to participate in the care of your patient.        Sincerely,        Jelani Larios DPM

## 2022-05-10 NOTE — TELEPHONE ENCOUNTER
Patient called and states she forgot to mention that she gets a lot of cramping throughout the day.  No need to call her back unless we have other questions/concerns in light of the cramping symptoms.      [phone]

## 2022-05-10 NOTE — PATIENT INSTRUCTIONS
What are Prescription Custom Orthotics?  Custom orthotics are specially-made devices designed to support and comfort your feet. Prescription orthotics are crafted for you and no one else. They match the contours of your feet precisely and are designed for the way you move. Orthotics are only manufactured after a podiatrist has conducted a complete evaluation of your feet, ankles, and legs, so the orthotic can accommodate your unique foot structure and pathology.  Prescription orthotics are divided into two categories:  Functional orthotics are designed to control abnormal motion. They may be used to treat foot pain caused by abnormal motion; they can also be used to treat injuries such as shin splints or tendinitis. Functional orthotics are usually crafted of a semi-rigid material such as plastic or graphite.  Accommodative orthotics are softer and meant to provide additional cushioning and support. They can be used to treat diabetic foot ulcers, painful calluses on the bottom of the foot, and other uncomfortable conditions.  Podiatrists use orthotics to treat foot problems such as plantar fasciitis, bursitis, tendinitis, diabetic foot ulcers, and foot, ankle, and heel pain. Clinical research studies have shown that podiatrist-prescribed foot orthotics decrease foot pain and improve function.  Orthotics typically cost more than shoe inserts purchased in a retail store, but the additional cost is usually well worth it. Unlike shoe inserts, orthotics are molded to fit each individual foot, so you can be sure that your orthotics fit and do what they're supposed to do. Prescription orthotics are also made of top-notch materials and last many years when cared for properly. Insurance often helps pay for prescription orthotics.  What are Shoe Inserts?   You've seen them at the grocery store and at the mall. You've probably even seen them on TV and online. Shoe inserts are any kind of non-prescription foot support designed  to be worn inside a shoe. Pre-packaged, mass produced, arch supports are shoe inserts. So are the  custom-made  insoles and foot supports that you can order online or at retail stores. Unless the device has been prescribed by a doctor and crafted for your specific foot, it's a shoe insert, not a custom orthotic device--despite what the ads might say.  Shoe inserts can be very helpful for a variety of foot ailments, including flat arches and foot and leg pain. They can cushion your feet, provide comfort, and support your arches, but they can't correct biomechanical foot problems or cure long-standing foot issues.  The most common types of shoe inserts are:  Arch supports: Some people have high arches. Others have low arches or flat feet. Arch supports generally have a  bumped-up  appearance and are designed to support the foot's natural arch.   Insoles: Insoles slip into your shoe to provide extra cushioning and support. Insoles are often made of gel, foam, or plastic.   Heel liners: Heel liners, sometimes called heel pads or heel cups, provide extra cushioning in the heel region. They may be especially useful for patients who have foot pain caused by age-related thinning of the heels' natural fat pads.   Foot cushions: Do your shoes rub against your heel or your toes? Foot cushions come in many different shapes and sizes and can be used as a barrier between you and your shoe.  Choosing an Over-the-Counter Shoe Insert  Selecting a shoe insert from the wide variety of devices on the market can be overwhelming. Here are some podiatrist-tested tips to help you find the insert that best meets your needs:  Consider your health. Do you have diabetes? Problems with circulation? An over-the-counter insert may not be your best bet. Diabetes and poor circulation increase your risk of foot ulcers and infections, so schedule an appointment with a podiatrist. He or she can help you select a solution that won't cause additional  health problems.   Think about the purpose. Are you planning to run a marathon, or do you just need a little arch support in your work shoes? Look for a product that fits your planned level of activity.   Bring your shoes. For the insert to be effective, it has to fit into your shoes. So bring your sneakers, dress shoes, or work boots--whatever you plan to wear with your insert. Look for an insert that will fit the contours of your shoe.   Try them on. If all possible, slip the insert into your shoe and try it out. Walk around a little. How does it feel? Don't assume that feelings of pressure will go away with continued wear. (If you can't try the inserts at the store, ask about the store's return policy and hold on to your receipt.)    Please call one of the [facility] locations below to schedule an appointment. If you received a prescription please bring it with you to your appointment. Some locations are limited to what they carry.    Office Locations    [facility]  [address] [place] [address]  Home Medical Equipment, [address]   Phone: [phone]   Orthotics and Prosthetics, [address]   Phone: [phone]    [facility]  [address]  [address]   [place] [address]   Phone: [phone]    [facility] Professional [facility]ldg[facility]  [address]  [place] [address]  Phone: [phone]    [facility] Medical [facility]ldg[facility]   [address]  [place] [address]  Phone: [phone]    [facility]  [address]  [place] [address]  Phone: [phone]    [facility]  [address]  [place] [address]  Phone: [phone]    [place]   [address] [place] [address]   Phone: [phone]    WEARING YOUR CUSTOM FOOT ORTHOTICS   Most  insurance plans cover one pair of orthotics per year. You must check with your   insurance plan to see what your payment responsibility will be. Please call your   insurance company by calling the number on the back of your insurance card.   Orthotic's are non-refundable and non-returnable.   Orthotics are made of various designs. Some orthotics are covered with material that extends beyond your toes. If your orthotic is of this design, you will likely need to trim the toe end to get a proper fit. The insole from your shoe can be used as a template. Simply overlay the shoe insert on top of the custom orthotic. Align the heel end while tracing the length of the insert onto the custom orthotic. Use a large scissor to trim the toe end until you get a proper fit in the shoe.   The orthotic needs to be pushed as far back in the shoe as possible. The heel portion should not ride forward so as not to irritate your heel.   Orthotics are designed to work with socks. Excessive perspiration will shorten the life span of the orthotics. Remove the orthotic from the shoe frequently for proper drying.   The break-in period lasts for weeks. People new to orthotics will likely experience new aches and pains. The orthotic is forcing your foot into a new position. Arch, foot and leg muscle aches and fatigue are common during these weeks. Minor discomfort can be considered normal break in phenomenon. Start wearing your orthotic around your home your first day. Limited activity for one to two hours is recommended. You can increase one or two additional hours each day provided the aches and pains are subsiding. The degree of discomfort, fatigue and problems will dictate the speed of break in. You may require multiple weeks to work up to full time use.   Do not continue wearing your orthotics if they are creating problems such as blisters or sores. Do not hesitate to call the clinic to speak with a nurse regarding orthotic   break in,  fit, trimming, etc. You may also need to see the doctor if the orthotics are   simply not working out. Adjustments are sometimes made to improve orthotic   function.     Orthotics will only work in certain styles and types of shoes. Orthotics rarely work in dress shoes. Slip-ons, clogs, sandals and heels are particularly troublesome. Specially designed orthotics may be necessary for these types of shoes. Your custom orthotic was designed for activities that require appropriate walking or running shoes. Lace up athletic shoes, walking shoes or work boots should work appropriately. You may need a wider or longer shoe. Shoes with a removable  or insert work best. In general, you want to remove an insert from the shoe before placing the orthotic into the shoe. Shoes without a removable liner may not work as well.     When purchasing new shoes, bring your orthotics along to get a proper fit. Shop at stores that are familiar with orthotics.   Frequent washing of the orthotic may shorten the life span of the top cover. The top cover can be replaced but will generally last one to five years depending on use and foot perspiration.

## 2022-08-18 ENCOUNTER — NURSE TRIAGE (OUTPATIENT)
Dept: NURSING | Facility: CLINIC | Age: 72
End: 2022-08-18

## 2022-08-18 NOTE — TELEPHONE ENCOUNTER
Nurse Triage SBAR    Is this a 2nd Level Triage? YES, LICENSED PRACTITIONER REVIEW IS REQUIRED    Situation: Covid Exposure, pharyngitis    Background: Pt thinks she was exposed to covid on Sunday. Symptoms started on Monday and now has a severe sore throat, states her tongue is white and it hurts to swallow. She no longer has her tonsils but has had strep throat before. States mild shortness of breath with activity. Pt feels she has a fever but has not taken her temperature. States she has a slight, dry cough and congestion. Denies chest pain or pressure.     Patient has not been tested for Covid.     Assessment: Possible covid, possible pharyngitis    Protocol Recommended Disposition:   See in Office Today    Recommendation:     Please provide recommendations for patient per SLT.      Routed to provider     TRISTEN BLAS RN      Does the patient meet one of the following criteria for ADS visit consideration? 16+ years old, with an [facility] PCP     TIP  Providers, please consider if this condition is appropriate for management at one of our Acute and Diagnostic Services sites.     If patient is a good candidate, please use dotphrase <dot>triageresponse and select Refer to ADS to document.    Reason for Disposition    SEVERE sore throat pain    MILD difficulty breathing (e.g., minimal/no SOB at rest, SOB with walking, pulse <100)    Additional Information    Negative: SEVERE difficulty breathing (e.g., struggling for each breath, speaks in single words)    Negative: Sounds like a life-threatening emergency to the triager    Negative: Throat culture results, call about    Negative: Productive cough is main symptom    Negative: Runny nose is main symptom    Negative: Drooling or spitting out saliva (because can't swallow)    Negative: Unable to open mouth completely    Negative: Drinking very little and has signs of dehydration (e.g., no urine > 12 hours, very dry mouth, very lightheaded)    Negative: Patient  sounds very sick or weak to the triager    Negative: Difficulty breathing (per caller) but not severe    Negative: Fever > 103 F (39.4 C)    Negative: Refuses to drink anything for > 12 hours    Negative: SEVERE difficulty breathing (e.g., struggling for each breath, speaks in single words)    Negative: Difficult to awaken or acting confused (e.g., disoriented, slurred speech)    Negative: Bluish (or gray) lips or face now    Negative: Shock suspected (e.g., cold/pale/clammy skin, too weak to stand, low BP, rapid pulse)    Negative: Sounds like a life-threatening emergency to the triager    Negative: [1] Diagnosed or suspected COVID-19 AND [2] symptoms lasting 3 or more weeks    Negative: [1] COVID-19 exposure AND [2] no symptoms    Negative: COVID-19 vaccine reaction suspected (e.g., fever, headache, muscle aches) occurring 1 to 3 days after getting vaccine    Negative: COVID-19 vaccine, questions about    Negative: [1] Lives with someone known to have influenza (flu test positive) AND [2] flu-like symptoms (e.g., cough, runny nose, sore throat, SOB; with or without fever)    Negative: [1] Adult with possible COVID-19 symptoms AND [2] triager concerned about severity of symptoms or other causes    Negative: COVID-19 and breastfeeding, questions about    Negative: SEVERE or constant chest pain or pressure  (Exception: Mild central chest pain, present only when coughing.)    Negative: MODERATE difficulty breathing (e.g., speaks in phrases, SOB even at rest, pulse 100-120)    Negative: [1] Headache AND [2] stiff neck (can't touch chin to chest)    Negative: Oxygen level (e.g., pulse oximetry) 90 percent or lower    Negative: Chest pain or pressure    Negative: Patient sounds very sick or weak to the triager    Protocols used: SORE THROAT-A-OH, CORONAVIRUS (COVID-19) DIAGNOSED OR [id number] 1.18.2022

## 2022-08-18 NOTE — TELEPHONE ENCOUNTER
I spoke to the patient and relayed recommendations to her. She verbalizes understanding. Transferred patient to scheduling for a COVID treatment virtual appointment.

## 2022-08-18 NOTE — TELEPHONE ENCOUNTER
Left VM for patient to call us back.     Please relay message from Dr. Schaeffer. Help patient schedule a virtual visit.

## 2022-08-18 NOTE — TELEPHONE ENCOUNTER
FYI - Patient took at home test and it was positive. Patient is wondering if virtual visit is needed still

## 2022-09-13 ENCOUNTER — ANCILLARY PROCEDURE (OUTPATIENT)
Dept: MAMMOGRAPHY | Facility: CLINIC | Age: 72
End: 2022-09-13
Attending: FAMILY MEDICINE
Payer: MEDICARE

## 2022-09-13 DIAGNOSIS — Z12.31 VISIT FOR SCREENING MAMMOGRAM: ICD-10-CM

## 2022-09-13 PROCEDURE — 77067 SCR MAMMO BI INCL CAD: CPT

## 2022-09-16 ENCOUNTER — OFFICE VISIT (OUTPATIENT)
Dept: FAMILY MEDICINE | Facility: CLINIC | Age: 72
End: 2022-09-16
Payer: MEDICARE

## 2022-09-16 VITALS
SYSTOLIC BLOOD PRESSURE: 130 MMHG | HEART RATE: 100 BPM | WEIGHT: 227 LBS | BODY MASS INDEX: 38.36 KG/M2 | DIASTOLIC BLOOD PRESSURE: 80 MMHG | OXYGEN SATURATION: 99 %

## 2022-09-16 DIAGNOSIS — M65.30 TRIGGER FINGER, ACQUIRED: ICD-10-CM

## 2022-09-16 DIAGNOSIS — M79.641 PAIN OF RIGHT HAND: Primary | ICD-10-CM

## 2022-09-16 PROCEDURE — 99213 OFFICE O/P EST LOW 20 MIN: CPT | Performed by: FAMILY MEDICINE

## 2022-09-16 ASSESSMENT — PAIN SCALES - GENERAL: PAINLEVEL: NO PAIN (0)

## 2022-09-16 NOTE — PROGRESS NOTES
Assessment/Plan:    Pain of right hand  Right hand pain described into the palm.  Area of trauma in the past from a pencil.  Question of early Dupuytren's contracture findings involving middle and ring fingers without significant deformity however.  We will have orthopedic hand specialist review with known history of osteoarthritic change of hand at prior x-rays as noted below.  - Orthopedic  Referral    Trigger finger, acquired  Right middle finger pain with deformity as well with nodularity at PIP and DIP joints of fingers consistent with osteoarthritic change.  Doubt tophaceous gout currently.  Patient describing trigger finger deformity and will have hand specialist review.  - Orthopedic  Referral          Subjective:    Susan Contreras is seen today for right hand pain.  Ongoing concern.  Progressive worsening however.  Has pain in the right palm.  Injured self with pencil previously as well.  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  Has had significant keyboard entry with history of described hard typing for the [employer] for over 18 years with up to 122 words per minute without error.  Reviewed injury in  as well.  Has used finger splinting in the past however feels the finger may dislocate.  When asked to clarify describes increasing pain at times when grasping that shoots from middle finger into the palm.  Comprehensive review of systems as above otherwise all negative.     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) - prior 122 words per minute without errors  Hobbies:  horseback riding  (but not 10 years); love to write; love to read    Past Surgical History:   Procedure Laterality Date     ARTHROSCOPY SHOULDER  1982     HC DILATION/CURETTAGE DIAG/THER NON OB      Description: Dilation And Curettage;  Recorded: 07/08/2014;     HC REMOVAL OF TONSILS,<12 Y/O      Description: Tonsillectomy;  Recorded: 07/08/2014;        Family History   Problem Relation Age of Onset     Heart Disease Mother      Lymphoma Mother      Diabetes Father         Past Medical History:   Diagnosis Date     Arthritis         Social History     Tobacco Use     Smoking status: Never Smoker     Smokeless tobacco: Never Used        No current outpatient medications on file.          Objective:    Vitals:    09/16/22 1259   BP: 130/80   Pulse: 100   SpO2: 99%   Weight: 103 kg (227 lb)      Body mass index is 38.36 kg/m .    Alert.  No apparent distress.  Right hand with osteoarthritic changes involving PIP and DIP joints especially.  Question of trigger finger deformity.  Question of mild Dupuytren's contracture with palmar fibrosis, mild.  No rash.      XR HAND RIGHT 3 OR MORE VWS4/3/2015 12:37 PMINDICATION: Hand pain.COMPARISON: 03/19/2015FINDINGS: Osteoarthritic changes again seen most prominent in the right STT, first carpometacarpal joint and distal interphalangeal joints of the second and third   digits with more moderate changes in the interphalangeal joint of the thumb and proximal interphalangeal joints of the other digits as well as the radiocarpal joint. There has been no significant change compared to the prior exam. No fractures or other   osseous abnormalities are identified.This report was electronically interpreted by: Dr. Sarah Hicks MD ON 04/03/2015 at 13:25          This note has been dictated using voice recognition software and as a result may contain minor grammatical errors and unintended word substitutions.       Answers for HPI/ROS submitted by the patient on 9/16/2022  What is the reason for  your visit today? : Right hand middle finger dislocates itself at times  How many servings of fruits and vegetables do you eat daily?: 0-1  On average, how many sweetened beverages do you drink each day (Examples: soda, juice, sweet tea, etc.  Do NOT count diet or artificially sweetened beverages)?: 0  How many days a week do you exercise enough to make your heart beat faster?: 5

## 2022-09-21 ENCOUNTER — TELEPHONE (OUTPATIENT)
Dept: FAMILY MEDICINE | Facility: CLINIC | Age: 72
End: 2022-09-21

## 2022-09-21 DIAGNOSIS — M79.641 PAIN OF RIGHT HAND: Primary | ICD-10-CM

## 2022-09-21 NOTE — TELEPHONE ENCOUNTER
Reason for Call: Request for an order or referral:    Order or referral being requested: Hand Therapy    Date needed: as soon as possible    Has the patient been seen by the PCP for this problem? YES    Additional comments: Patient was given an referal but [facility] needs an actual outside order for hand therapy at the [place] location fax number [phone]    Phone number Patient can be reached at:  Other phone number:  [phone]    Best Time:  anyhtime    Can we leave a detailed message on this number?  YES    Call taken on 9/21/2022 at 1:22 PM by Kandice Alvarez

## 2022-09-25 ENCOUNTER — HEALTH MAINTENANCE LETTER (OUTPATIENT)
Age: 72
End: 2022-09-25

## 2022-10-25 ENCOUNTER — OFFICE VISIT (OUTPATIENT)
Dept: FAMILY MEDICINE | Facility: CLINIC | Age: 72
End: 2022-10-25
Payer: MEDICARE

## 2022-10-25 ENCOUNTER — TELEPHONE (OUTPATIENT)
Dept: FAMILY MEDICINE | Facility: CLINIC | Age: 72
End: 2022-10-25

## 2022-10-25 ENCOUNTER — ANCILLARY PROCEDURE (OUTPATIENT)
Dept: GENERAL RADIOLOGY | Facility: CLINIC | Age: 72
End: 2022-10-25
Attending: FAMILY MEDICINE
Payer: MEDICARE

## 2022-10-25 VITALS
TEMPERATURE: 98.6 F | DIASTOLIC BLOOD PRESSURE: 76 MMHG | HEART RATE: 72 BPM | WEIGHT: 226.3 LBS | SYSTOLIC BLOOD PRESSURE: 132 MMHG | RESPIRATION RATE: 18 BRPM | BODY MASS INDEX: 37.7 KG/M2 | HEIGHT: 65 IN | OXYGEN SATURATION: 99 %

## 2022-10-25 DIAGNOSIS — R07.89 ATYPICAL CHEST PAIN: ICD-10-CM

## 2022-10-25 DIAGNOSIS — Z00.00 ENCOUNTER FOR ANNUAL WELLNESS VISIT (AWV) IN MEDICARE PATIENT: Primary | ICD-10-CM

## 2022-10-25 DIAGNOSIS — M79.641 BILATERAL HAND PAIN: ICD-10-CM

## 2022-10-25 DIAGNOSIS — R63.5 WEIGHT GAIN: ICD-10-CM

## 2022-10-25 DIAGNOSIS — E78.2 MIXED HYPERLIPIDEMIA: ICD-10-CM

## 2022-10-25 DIAGNOSIS — R13.10 DYSPHAGIA, UNSPECIFIED TYPE: ICD-10-CM

## 2022-10-25 DIAGNOSIS — K21.9 GASTROESOPHAGEAL REFLUX DISEASE WITHOUT ESOPHAGITIS: ICD-10-CM

## 2022-10-25 DIAGNOSIS — R73.01 ELEVATED FASTING GLUCOSE: ICD-10-CM

## 2022-10-25 DIAGNOSIS — M79.642 BILATERAL HAND PAIN: ICD-10-CM

## 2022-10-25 PROCEDURE — 73130 X-RAY EXAM OF HAND: CPT | Mod: TC | Performed by: RADIOLOGY

## 2022-10-25 PROCEDURE — G0439 PPPS, SUBSEQ VISIT: HCPCS | Performed by: FAMILY MEDICINE

## 2022-10-25 PROCEDURE — 99214 OFFICE O/P EST MOD 30 MIN: CPT | Mod: 25 | Performed by: FAMILY MEDICINE

## 2022-10-25 ASSESSMENT — ENCOUNTER SYMPTOMS
HEADACHES: 0
JOINT SWELLING: 0
BREAST MASS: 0
DIARRHEA: 0
SHORTNESS OF BREATH: 0
NERVOUS/ANXIOUS: 0
FEVER: 0
PALPITATIONS: 0
ABDOMINAL PAIN: 0
HEMATOCHEZIA: 0
CHILLS: 0
COUGH: 0
HEMATURIA: 0
HEARTBURN: 0
EYE PAIN: 0
NAUSEA: 0
WEAKNESS: 0
FREQUENCY: 0
ARTHRALGIAS: 0
SORE THROAT: 0
PARESTHESIAS: 0
CONSTIPATION: 0
DYSURIA: 0
MYALGIAS: 0
DIZZINESS: 0

## 2022-10-25 ASSESSMENT — ACTIVITIES OF DAILY LIVING (ADL)
DIFFICULTY_COMMUNICATING: NO
DRESSING/BATHING_DIFFICULTY: NO
CURRENT_FUNCTION: NO ASSISTANCE NEEDED
HEARING_DIFFICULTY_OR_DEAF: NO
FALL_HISTORY_WITHIN_LAST_SIX_MONTHS: NO
CHANGE_IN_FUNCTIONAL_STATUS_SINCE_ONSET_OF_CURRENT_ILLNESS/INJURY: NO
TOILETING_ISSUES: NO
WALKING_OR_CLIMBING_STAIRS_DIFFICULTY: NO
WEAR_GLASSES_OR_BLIND: YES
DOING_ERRANDS_INDEPENDENTLY_DIFFICULTY: NO
DIFFICULTY_EATING/SWALLOWING: NO
CONCENTRATING,_REMEMBERING_OR_MAKING_DECISIONS_DIFFICULTY: NO

## 2022-10-25 ASSESSMENT — PAIN SCALES - GENERAL: PAINLEVEL: NO PAIN (0)

## 2022-10-25 NOTE — PROGRESS NOTES
SUBJECTIVE:   Susan is a 72 year old who presents for Preventive Visit.    Is here today for annual visit with several concerns.  She brings with her a hand tight three-page document of an outline that is single spaced with multiple questions and concerns to address.    Medical history includes chronic back pain, acid reflux, osteoarthritis, diverticulosis, family history of colon cancer and elevated body mass index.    She had been seen in this past year for atypical chest pain.  She was referred for a stress test but declined to do so.  Patient reiterates she gets random pains usually associated with eating but sometimes at other times.  It does not typically occur with activity.  She does not report significant breathing difficulty such as dyspnea.  She does endorse some acid reflux symptoms.  She declines to pursue the stress test.  Discussed significance of her symptoms and recommended evaluation with endoscopy as it seems to be more likely related to esophageal dysfunction and possible irritation.    She has musculoskeletal complaints that include bilateral hand pain with evidence of arthritis formation of the distal aspects of the fingers.  She also reports chronic issues with back pain but no acute pain symptoms currently.  She has problems with her feet and has seen podiatry.  She reports some leg cramping and leg pain concerns that have occurred in the past.  Reviewed all these in great depth.  Patient also had COVID back at the middle the latter part of August 2022.  She has a list of symptoms that she wished to review that she had at the time but no longer has.    Today we discussed other aspects of routine health prevention.  She just had a mammogram done.  She is up-to-date with colonoscopy will come due for colon cancer screening and May 2023.  She had a DEXA scan performed within the past 2 years which showed normal bone density.  We discussed potential immunizations she declines immunizations at  this time but plans to have a COVID booster sometime in November when it has been 90 days since the onset of her bout of COVID.    Patient has been advised of split billing requirements and indicates understanding: Yes  Are you in the first 12 months of your Medicare coverage?  No    Do you feel safe in your environment? Yes    Have you ever done Advance Care Planning? (For example, a Health Directive, POLST, or a discussion with a medical provider or your loved ones about your wishes): Yes, advance care planning is on file.    Cognitive Screening   1) Repeat 3 items (Leader, Season, Table)    2) Clock draw:   3) 3 item recall:   Results: 3 items recalled: COGNITIVE IMPAIRMENT LESS LIKELY    Mini-CogTM Copyright S Rojas. Licensed by the author for use in [facility] medical; reprinted with permission ([email or web address]). All rights reserved.      Do you have sleep apnea, excessive snoring or daytime drowsiness?: yes    Reviewed and updated as needed this visit by clinical staff    Reviewed and updated as needed this visit by Provider    Social History     Tobacco Use     Smoking status: Never     Smokeless tobacco: Never   Substance Use Topics     Alcohol use: Not on file     If you drink alcohol do you typically have >3 drinks per day or >7 drinks per week? No      Current providers sharing in care for this patient include:   Patient Care Team:  Jerod Schaeffer MD as PCP - Jelani Mcdermott DPM as Assigned Musculoskeletal Provider  Jonathan Bishop MD as Assigned PCP    The following health maintenance items are reviewed in Epic and correct as of today:  Health Maintenance   Topic Date Due     ANNUAL REVIEW OF  ORDERS  Never done     HEPATITIS B IMMUNIZATION (1 of 3 - 3-dose series) Never done     ZOSTER IMMUNIZATION (1 of 2) Never done     Pneumococcal Vaccine: 65+ Years (1 - PCV) Never done     MEDICARE ANNUAL WELLNESS VISIT  09/04/2019     COVID-19 Vaccine (4 - Booster for Pfizer series) 01/12/2022  "    INFLUENZA VACCINE (1) Never done     COLORECTAL CANCER SCREENING  05/21/2023     LIPID  09/04/2023     FALL RISK ASSESSMENT  10/25/2023     MAMMO SCREENING  09/13/2024     ADVANCE CARE PLANNING  10/25/2027     DTAP/TDAP/TD IMMUNIZATION (2 - Td or Tdap) 09/04/2028     DEXA  08/03/2036     HEPATITIS C SCREENING  Completed     PHQ-2 (once per calendar year)  Completed     IPV IMMUNIZATION  Aged Out     MENINGITIS IMMUNIZATION  Aged Out               Review of Systems  Complete review of systems is obtained.  Other than the specific considerations noted above complete review of systems is negative.      OBJECTIVE:   /76   Pulse 72   Temp 98.6  F (37  C)   Resp 18   Ht 1.638 m (5' 4.5\")   Wt 102.6 kg (226 lb 4.8 oz)   LMP  (LMP Unknown)   SpO2 99%   BMI 38.24 kg/m   Estimated body mass index is 38.24 kg/m  as calculated from the following:    Height as of this encounter: 1.638 m (5' 4.5\").    Weight as of this encounter: 102.6 kg (226 lb 4.8 oz).  Physical Exam Answers for HPI/ROS submitted by the patient on 10/25/2022  In general, how would you rate your overall physical health?: good  Frequency of exercise:: 2-3 days/week  Do you usually eat at least 4 servings of fruit and vegetables a day, include whole grains & fiber, and avoid regularly eating high fat or \"junk\" foods? : No  Taking medications regularly:: Not Applicable  Medication side effects:: Not applicable  Activities of Daily Living: no assistance needed  Home safety: no safety concerns identified  Hearing Impairment:: no hearing concerns  In the past 6 months, have you been bothered by leaking of urine?: No  In general, how would you rate your overall mental or emotional health?: excellent  Additional concerns today:: Yes  Duration of exercise:: Other    Answers for HPI/ROS submitted by the patient on 10/25/2022  In general, how would you rate your overall physical health?: good  Frequency of exercise:: 2-3 days/week  Do you usually eat at " "least 4 servings of fruit and vegetables a day, include whole grains & fiber, and avoid regularly eating high fat or \"junk\" foods? : No  Taking medications regularly:: Not Applicable  Medication side effects:: Not applicable  Activities of Daily Living: no assistance needed  Home safety: no safety concerns identified  Hearing Impairment:: no hearing concerns  In the past 6 months, have you been bothered by leaking of urine?: No  In general, how would you rate your overall mental or emotional health?: excellent  Additional concerns today:: Yes  Duration of exercise:: Other              General Appearance:    Alert, cooperative, no distress   Eyes:   No scleral icterus or conjunctival irritation       Ears:    Normal TM's and external ear canals, both ears   Throat:   Lips, mucosa, and tongue normal; teeth and gums normal   Neck:   Supple, symmetrical, trachea midline, no adenopathy;        thyroid:  No enlargement/tenderness/nodules   Lungs:     Clear to auscultation bilaterally, respirations unlabored, no wheezes or crackles   Heart:    Regular rate and rhythm,  No murmur   Abdomen:    Soft, no distention, no tenderness on palpation, no masses, no organomegaly     Extremities:  In the distal interphalangeal joints of both hands there is evidence of arthritis formation.   Skin:  Typical small skin cyst measuring less than 1 cm present in the left axillary area   Neurologic:  On gross examination there is no motor or sensory deficit.  Patient walks with a normal gait                 ASSESSMENT / PLAN:   Susan was seen today for wellness visit and recheck medication.    Diagnoses and all orders for this visit:    Encounter for annual wellness visit (AWV) in Medicare patient    Mixed hyperlipidemia  -     Lipid panel reflex to direct LDL Fasting    Atypical chest pain  -     CBC with platelets  -     Comprehensive metabolic panel  -     Adult GI  Referral - Procedure Only; Future    Gastroesophageal reflux " "disease without esophagitis  -     CBC with platelets  -     Comprehensive metabolic panel  -     Adult GI  Referral - Procedure Only; Future    Dysphagia, unspecified type  -     CBC with platelets  -     Comprehensive metabolic panel  -     Adult GI  Referral - Procedure Only; Future    Bilateral hand pain  -     XR Hand Bilateral G/E 3 Views; Future    Elevated fasting glucose  -     Hemoglobin A1c    Weight gain  -     TSH           Patient has been advised of split billing requirements and indicates understanding: Yes      COUNSELING:  Reviewed preventive health counseling, as reflected in patient instructions       Healthy diet/nutrition       Vision screening       Dental care       Osteoporosis prevention/bone health       Colon cancer screening    Estimated body mass index is 38.24 kg/m  as calculated from the following:    Height as of this encounter: 1.638 m (5' 4.5\").    Weight as of this encounter: 102.6 kg (226 lb 4.8 oz).    Weight management plan: Discussed healthy diet and exercise guidelines    She reports that she has never smoked. She has never used smokeless tobacco.      Appropriate preventive services were discussed with this patient, including applicable screening as appropriate for cardiovascular disease, diabetes, osteopenia/osteoporosis, and glaucoma.  As appropriate for age/gender, discussed screening for colorectal cancer, prostate cancer, breast cancer, and cervical cancer. Checklist reviewing preventive services available has been given to the patient.    Reviewed patients plan of care and provided an AVS. The Basic Care Plan (routine screening as documented in Health Maintenance) for Susan meets the Care Plan requirement. This Care Plan has been established and reviewed with the Patient.    Counseling Resources:  ATP IV Guidelines  Pooled Cohorts Equation Calculator  Breast Cancer Risk Calculator  Breast Cancer: Medication to Reduce Risk  FRAX Risk " Assessment  ICSI Preventive Guidelines  Dietary Guidelines for Americans, 2010  USDA's MyPlate  ASA Prophylaxis  Lung CA Screening    Jerod Schaeffer MD, MD  [facility]    Identified Health Risks:    Wt Readings from Last 3 Encounters:   10/25/22 102.6 kg (226 lb 4.8 oz)   09/16/22 103 kg (227 lb)   05/10/22 105.2 kg (232 lb)        BP Readings from Last 6 Encounters:   10/25/22 132/76   09/16/22 130/80   05/17/21 126/74   02/07/20 106/68   12/22/19 117/69

## 2022-10-25 NOTE — TELEPHONE ENCOUNTER
Unsure if patient got her labs done today at visit.    Left message for her to come back or re-schedule.    Also a POKKT message was sent.

## 2022-11-14 ENCOUNTER — ALLIED HEALTH/NURSE VISIT (OUTPATIENT)
Dept: FAMILY MEDICINE | Facility: CLINIC | Age: 72
End: 2022-11-14
Payer: MEDICARE

## 2022-11-14 ENCOUNTER — APPOINTMENT (OUTPATIENT)
Dept: LAB | Facility: CLINIC | Age: 72
End: 2022-11-14
Payer: MEDICARE

## 2022-11-14 DIAGNOSIS — Z23 ENCOUNTER FOR IMMUNIZATION: Primary | ICD-10-CM

## 2022-11-14 LAB
ALBUMIN SERPL BCG-MCNC: 4 G/DL (ref 3.5–5.2)
ALP SERPL-CCNC: 78 U/L (ref 35–104)
ALT SERPL W P-5'-P-CCNC: 15 U/L (ref 10–35)
ANION GAP SERPL CALCULATED.3IONS-SCNC: 9 MMOL/L (ref 7–15)
AST SERPL W P-5'-P-CCNC: 24 U/L (ref 10–35)
BILIRUB SERPL-MCNC: 0.5 MG/DL
BUN SERPL-MCNC: 16.1 MG/DL (ref 8–23)
CALCIUM SERPL-MCNC: 9.2 MG/DL (ref 8.8–10.2)
CHLORIDE SERPL-SCNC: 102 MMOL/L (ref 98–107)
CHOLEST SERPL-MCNC: 225 MG/DL
CREAT SERPL-MCNC: 0.89 MG/DL (ref 0.51–0.95)
DEPRECATED HCO3 PLAS-SCNC: 27 MMOL/L (ref 22–29)
ERYTHROCYTE [DISTWIDTH] IN BLOOD BY AUTOMATED COUNT: 13.1 % (ref 10–15)
GFR SERPL CREATININE-BSD FRML MDRD: 69 ML/MIN/1.73M2
GLUCOSE SERPL-MCNC: 115 MG/DL (ref 70–99)
HBA1C MFR BLD: 6 % (ref 0–5.6)
HCT VFR BLD AUTO: 43.2 % (ref 35–47)
HDLC SERPL-MCNC: 53 MG/DL
HGB BLD-MCNC: 14 G/DL (ref 11.7–15.7)
LDLC SERPL CALC-MCNC: 150 MG/DL
MCH RBC QN AUTO: 27.8 PG (ref 26.5–33)
MCHC RBC AUTO-ENTMCNC: 32.4 G/DL (ref 31.5–36.5)
MCV RBC AUTO: 86 FL (ref 78–100)
NONHDLC SERPL-MCNC: 172 MG/DL
PLATELET # BLD AUTO: 216 10E3/UL (ref 150–450)
POTASSIUM SERPL-SCNC: 4.4 MMOL/L (ref 3.4–5.3)
PROT SERPL-MCNC: 7.2 G/DL (ref 6.4–8.3)
RBC # BLD AUTO: 5.03 10E6/UL (ref 3.8–5.2)
SODIUM SERPL-SCNC: 138 MMOL/L (ref 136–145)
TRIGL SERPL-MCNC: 111 MG/DL
TSH SERPL DL<=0.005 MIU/L-ACNC: 3.37 UIU/ML (ref 0.3–4.2)
WBC # BLD AUTO: 7.3 10E3/UL (ref 4–11)

## 2022-11-14 PROCEDURE — 80053 COMPREHEN METABOLIC PANEL: CPT | Performed by: FAMILY MEDICINE

## 2022-11-14 PROCEDURE — 99207 PR NO CHARGE NURSE ONLY: CPT

## 2022-11-14 PROCEDURE — G0008 ADMIN INFLUENZA VIRUS VAC: HCPCS

## 2022-11-14 PROCEDURE — 90662 IIV NO PRSV INCREASED AG IM: CPT

## 2022-11-14 PROCEDURE — 36415 COLL VENOUS BLD VENIPUNCTURE: CPT | Performed by: FAMILY MEDICINE

## 2022-11-14 PROCEDURE — 80061 LIPID PANEL: CPT | Performed by: FAMILY MEDICINE

## 2022-11-14 PROCEDURE — 85027 COMPLETE CBC AUTOMATED: CPT | Performed by: FAMILY MEDICINE

## 2022-11-14 PROCEDURE — 84443 ASSAY THYROID STIM HORMONE: CPT | Performed by: FAMILY MEDICINE

## 2022-11-14 PROCEDURE — 83036 HEMOGLOBIN GLYCOSYLATED A1C: CPT | Performed by: FAMILY MEDICINE

## 2022-12-14 ENCOUNTER — IMMUNIZATION (OUTPATIENT)
Dept: NURSING | Facility: CLINIC | Age: 72
End: 2022-12-14
Payer: MEDICARE

## 2022-12-14 PROCEDURE — 91312 COVID-19 VACCINE BIVALENT BOOSTER 12+ (PFIZER): CPT

## 2022-12-14 PROCEDURE — 0124A COVID-19 VACCINE BIVALENT BOOSTER 12+ (PFIZER): CPT

## 2023-01-12 ENCOUNTER — TELEPHONE (OUTPATIENT)
Dept: FAMILY MEDICINE | Facility: CLINIC | Age: 73
End: 2023-01-12
Payer: MEDICARE

## 2023-01-12 DIAGNOSIS — Z23 NEED FOR VACCINATION: Primary | ICD-10-CM

## 2023-01-13 ENCOUNTER — NURSE TRIAGE (OUTPATIENT)
Dept: FAMILY MEDICINE | Facility: CLINIC | Age: 73
End: 2023-01-13

## 2023-01-13 NOTE — TELEPHONE ENCOUNTER
Called back and no answer. Tried cell (straight to ) and then left a message on home phone.   Patient had stated when I talked to her earlier that she had to leave to  her Grandson.   Left a message directing patient to be evaluated at Urgent Care. That it was very important that she has her labs drawn and get any necessary treatment based on those levels.   Asked her to call me back to ensure that she received the message and provided her with clinic phone number.  If no return call, will check up on her on Monday. Hannah Meeks RN on 1/13/2023 at 1:57 PM

## 2023-01-13 NOTE — TELEPHONE ENCOUNTER
Spoke with patient and reinforced advice from Dr. Schaeffer. She said that she will go to urgent care either tonight or tomorrow morning. She said that she would pass on the advice to her Grandson and his mom, because he had been having same symptoms and lives in house. Hannah Meeks RN on 1/13/2023 at 3:18 PM

## 2023-01-13 NOTE — TELEPHONE ENCOUNTER
"Pt told by [employer] that carbon monoxide levels were too high and they shut off her furnace and directed to get tested.   Denies h/a, dizziness, nausea, vomiting, or shortness of breath. Admits to falling asleep easily during the day for the last 2 weeks.   Also notes that her grandson also seems to be napping more during the day.       Reason for Disposition    [1] KNOWN CO EXPOSURE (e.g., other victims with CO poisoning) within past 24 hours AND [2] CO poisoning symptoms present now    Additional Information    Negative: [1] Breathing stopped AND [2] hasn't returned    Negative: Bluish (or gray) lips or face now    Negative: Difficult to awaken or acting confused (e.g., disoriented, slurred speech)    Negative: Difficulty breathing    Negative: Chest pain or heaviness (present now)    Negative: Seizure    Negative: Patient attempted suicide    Negative: Sounds like a life-threatening emergency to the triager    Answer Assessment - Initial Assessment Questions  1. SYMPTOMS: \"What symptoms are you having?\" (e.g., none, headache, dizziness, nausea)      Tiredness. Falling asleep easily and unusually.   2. ONSET:  \"When did the symptoms begin?\"      Before New Years  3. SOURCE OF EXPOSURE: \"What happened?\" (e.g., broken furnace, home fire)      Broken furnace  4. CO EXPOSURE:  \"Were you exposed to carbon monoxide?\"     - KNOWN - high CO measured in air; coworker or family member diagnosed with CO poisoning;      - SUSPECTED - CO alarm sounded; exposure to car fumes, burning charcoal, burning kerosene, or other gas burning device.      KNOWN. Per [employer].   5. OTHERS: \"Is anyone else having similar/same symptoms?\"       Grandson  6. TREATMENT: \"What have you done so far to treat this?\" (e.g., open the windows, go outside)      [employer] turned off the furnace.   7. PREGNANCY: \"Is there any chance you are pregnant?\" \"When was your last menstrual period?\"      No    Protocols used: CARBON MONOXIDE EXPOSURE-A-FÁTIMA Meeks, " RN on 1/13/2023 at 1:46 PM

## 2023-01-14 ENCOUNTER — HOSPITAL ENCOUNTER (EMERGENCY)
Facility: HOSPITAL | Age: 73
Discharge: HOME | End: 2023-01-14
Attending: EMERGENCY MEDICINE | Admitting: EMERGENCY MEDICINE
Payer: MEDICARE

## 2023-01-14 VITALS
RESPIRATION RATE: 16 BRPM | SYSTOLIC BLOOD PRESSURE: 151 MMHG | DIASTOLIC BLOOD PRESSURE: 72 MMHG | HEART RATE: 89 BPM | BODY MASS INDEX: 39.39 KG/M2 | OXYGEN SATURATION: 99 % | WEIGHT: 233.1 LBS | TEMPERATURE: 98.2 F

## 2023-01-14 DIAGNOSIS — Z77.29 CARBON MONOXIDE EXPOSURE: ICD-10-CM

## 2023-01-14 LAB — COHGB MFR BLD: 1.3 % (ref 0–1.5)

## 2023-01-14 PROCEDURE — 36415 COLL VENOUS BLD VENIPUNCTURE: CPT | Performed by: STUDENT IN AN ORGANIZED HEALTH CARE EDUCATION/TRAINING PROGRAM

## 2023-01-14 PROCEDURE — 82375 ASSAY CARBOXYHB QUANT: CPT | Performed by: EMERGENCY MEDICINE

## 2023-01-14 PROCEDURE — 99283 EMERGENCY DEPT VISIT LOW MDM: CPT

## 2023-01-14 NOTE — ED PROVIDER NOTES
EMERGENCY DEPARTMENT ENCOUNTER            IMPRESSION:  Suspected carbon monoxide poisoning      MEDICAL DECISION MAKING:  Patient reports suspected carbon monoxide exposure.  She is asymptomatic at this time.    On exam her blood pressure slightly elevated otherwise exam is normal    Carbon oxide is not elevated    Discussed precautions with patient.  She is stable for discharge home          =================================================================  CHIEF COMPLAINT:  Chief Complaint   Patient presents with     Fatigue     Toxic Inhalation         HPI  Susan Contreras is a 72 year old female with a history of GERD, obesity, who presents to the ED by walk-in for evaluation of fatigue, inhalation.    Patient reports that yesterday she found out there had been a gas leak from her furnace over the past few weeks. This was found when [employer] came to inspect the furnace for a separate reason. The furnace was shut off immediately and patient has been using and electric heater since. Patient states that she had been noticeably fatigued recently, but this improved today with the furnace turned off. Patient denies any other complaints.       I, Sukumar Lima am serving as a scribe to document services personally performed by Dr. Kobe Kaye MD, based on my observation and the provider's statements to me. I, Dr. Kobe Kaye MD attest that Sukumar Lima is acting in a scribe capacity, has observed my performance of the services and has documented them in accordance with my direction.      REVIEW OF SYSTEMS   Constitutional: Positive for fatigue (resolved). Denies fever, chills, unintentional weight loss  Eyes: Denies visual changes or discharge    HENT: Denies sore throat, ear pain or neck pain  Respiratory: Denies cough or shortness of breath    Cardiovascular: Denies chest pain, palpitations or leg swelling  GI: Denies abdominal pain, nausea, vomiting, or dark, bloody stools.    : Denies hematuria, dysuria, or  flank pain  Musculoskeletal: Denies any new back pain or new muscle/joint pains  Skin: Denies rash or wound  Neurologic: Denies current headache, new weakness, focal weakness, or sensory changes    Lymphatic: Denies swollen glands    Psychiatric: Denies depression, suicidal ideation or homicidal ideation.    Remainder of systems reviewed, unless noted in HPI all others negative.      PAST MEDICAL HISTORY:  Past Medical History:   Diagnosis Date     Arthritis        PAST SURGICAL HISTORY:  Past Surgical History:   Procedure Laterality Date     ARTHROSCOPY SHOULDER  1982     HC DILATION/CURETTAGE DIAG/THER NON OB      Description: Dilation And Curettage;  Recorded: 07/08/2014;     HC REMOVAL OF TONSILS,<12 Y/O      Description: Tonsillectomy;  Recorded: 07/08/2014;         CURRENT MEDICATIONS:    No current outpatient medications on file.      ALLERGIES:  Allergies   Allergen Reactions     Acetaminophen Unknown     Codeine Nausea and Vomiting     House Dust [Dust Mite Extract] Unknown     Hydrocodone Bitartrate [Hydrocodone] Unknown     Hydrocodone-Acetaminophen Unknown     Abdominal pain       Latex Unknown       FAMILY HISTORY:  Family History   Problem Relation Age of Onset     Heart Disease Mother      Lymphoma Mother      Diabetes Father        SOCIAL HISTORY:   Social History     Socioeconomic History     Marital status:    Tobacco Use     Smoking status: Never     Smokeless tobacco: Never   Vaping Use     Vaping Use: Never used       PHYSICAL EXAM:    BP (!) 151/72   Pulse 89   Temp 98.2  F (36.8  C) (Oral)   Resp 16   Wt 105.7 kg (233 lb 1.6 oz)   LMP  (LMP Unknown)   SpO2 99%   BMI 39.39 kg/m      Constitutional: Awake, alert, in no acute distress  Head: Normocephalic, atraumatic.  ENT: Mucous membranes moist. Posterior oropharynx appears normal.  Eyes: Pupils midrange and reactive ,no conjunctival discharge  Neck: No lymphadenopathy, no stridor, supple, no soft tissue swelling  Chest: No  tenderness   Respiratory: Respirations even, unlabored. Lungs clear to ascultation bilaterally, in no acute respiratory distress.  Cardiovascular: Regular rate and rhythm.+2 radial pulses, equal bilaterally.  No murmurs.   GI: Abdomen soft, non-tender to palpation in all 4 quadrants. No guarding or rebound. Bowel sounds intact on all 4 quadrants.   Back: No CVA tenderness.    Musculoskeletal: Moves all 4 extremities equally, strength symmetrical on bilateral uppers and lowers.  No peripheral edema  Integument: Warm, dry. No rash. No bruising or petechiae.  Lymphatic: No cervical lymphadenopathy  Neurologic: Alert & oriented x 3. Normal speech. Grossly normal motor and sensory function. No focal deficits noted.  NIHSS = 0  Psychiatric: Normal mood and affect. Normal judgement.    ED COURSE:    1:08 PM I met with patient for initial interview and encounter. PPE worn includes surgical mask. We discussed plan for discharge to which patient is agreeable.         Medical Decision Making    History:    Supplemental history from: N/A    External Record(s) reviewed: Outpatient Record: Care Everywhere    Work Up:    Chart documentation includes differential considered and any EKGs or imaging independently interpreted by provider.    In additional to work up documented, I considered the following work up: See chart documentation, if applicable.    External consultation:    Discussion of management with another provider: See chart documentation, if applicable    Complicating factors:    Care impacted by chronic illness: N/A    Care affected by social determinants of health: N/A    Disposition considerations: Considered admission for the patient however she is stable and asymptomatic and tests are within normal range         LAB:  Laboratory results were independently reviewed and interpreted  Results for orders placed or performed during the hospital encounter of 01/14/23   Result Value Ref Range    Carbon Monoxide 1.3 0.0 -  1.5 %              FINAL DIAGNOSIS:    ICD-10-CM    1. Carbon monoxide exposure  Z77.29                At the conclusion of the encounter I discussed the results of all of the tests and the disposition. The questions were answered. The patient or family acknowledged understanding and was agreeable with the care plan.     NAME: Susan Contreras  AGE: 72 year old female  YOB: 1950  MRN: [id number]  EVALUATION DATE & TIME: No admission date for patient encounter.    PCP: Jerod Schaeffer    ED PROVIDER: Kobe Kaye M.D.      Sukumar BRAXTON, am serving as a scribe to document services personally performed by Dr. Kobe Kaye based on my observation and the provider's statements to me. I, Kobe Kaye MD attest that Sukumar Lima is acting in a scribe capacity, has observed my performance of the services and has documented them in accordance with my direction.    Kobe Kaye M.D.  Emergency Medicine  [facility] EMERGENCY DEPARTMENT  [address] [place] [id number]  [phone]  Dept: [phone]  1/14/2023       Kobe Kaye MD  01/14/23 9834

## 2023-01-14 NOTE — ED TRIAGE NOTES
Pt states that she had a gas leak from her furnace over the last few weeks. Pt reports falling asleep or sleeping long periods of times over the last few weeks. The gas leak was an accidental find. [employer] gave patient a warning sheet (she has it in her possession for MD to look at) Furnace was shut off yesterday morning and pt states she feels better. Other people in the house have been sleeping more. Pt denies other symptoms.      Triage Assessment     Row Name 01/14/23 4067       Triage Assessment (Adult)    Airway WDL WDL       Respiratory WDL    Respiratory WDL WDL       Skin Circulation/Temperature WDL    Skin Circulation/Temperature WDL WDL       Cardiac WDL    Cardiac WDL WDL       Peripheral/Neurovascular WDL    Peripheral Neurovascular WDL WDL       Cognitive/Neuro/Behavioral WDL    Cognitive/Neuro/Behavioral WDL WDL

## 2023-08-21 ENCOUNTER — NURSE TRIAGE (OUTPATIENT)
Dept: NURSING | Facility: CLINIC | Age: 73
End: 2023-08-21
Payer: MEDICARE

## 2023-08-21 ENCOUNTER — TELEPHONE (OUTPATIENT)
Dept: FAMILY MEDICINE | Facility: CLINIC | Age: 73
End: 2023-08-21
Payer: MEDICARE

## 2023-08-21 NOTE — TELEPHONE ENCOUNTER
Had called on Aug 10th to set up mammogram. She had chest pain off and on for last year or two so answered yes when they asked her. They wanted a bilateral diagnostic mammogram and ultrasound done because of that chest pain and Dr Schaeffer has to set it up. 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 She's talked to Dr Schaeffer about it  in the past. Does Dr Schaeffer want to see her or will he go ahead and order the mammogram bilateral diagnostic and ultrasound? Please call her at:  [phone].  Elida Apodaca RN  [facility]    Reason for Disposition   Nursing judgment    Additional Information   Negative: Nursing judgment   Negative: Nursing judgment   Negative: Nursing judgment    Protocols used: Information Only Call - No Triage-A-[place]     Problem: Knowledge Deficit  Goal: Patient/Support person demonstrates understanding regarding the progression of labor, available options and participates in decision-making process  Outcome: PROGRESSING AS EXPECTED  Discussed balloon and expectations for cervical dilation. Answered all questions.    Problem: Pain  Goal: Alleviation of Pain or a reduction in pain to the patient’s comfort goal  Outcome: PROGRESSING AS EXPECTED  Discussed pain management options and positions to alleviate pain.

## 2023-08-21 NOTE — TELEPHONE ENCOUNTER
Pt is calling requesting new orders for her mammogram. Pt sates that she was scheduling her mammogram and answered one of the questions wrong and now is not able to schedule until a different order is placed.     Pt states that when they asked about chest pain she said yes. She does not know if that is related to her mammogram. Pt states that she is active so feels like that is why she has chest pain.     Pt is needing an order for a diagnostic bilateral mammogram and ultrasound bilateral and screening mammogram.     Please place appropriate orders.

## 2023-08-21 NOTE — TELEPHONE ENCOUNTER
Writer called and assisted with scheduling patient with PCP for 9/5/23.    NABILA AbarcaN, RN  [facility]

## 2023-08-31 PROBLEM — K63.5 POLYP OF COLON: Status: ACTIVE | Noted: 2018-05-21

## 2023-08-31 PROBLEM — Z86.0100 HISTORY OF COLONIC POLYPS: Status: ACTIVE | Noted: 2018-05-23

## 2023-09-05 ENCOUNTER — OFFICE VISIT (OUTPATIENT)
Dept: FAMILY MEDICINE | Facility: CLINIC | Age: 73
End: 2023-09-05
Payer: MEDICARE

## 2023-09-05 ENCOUNTER — ANCILLARY PROCEDURE (OUTPATIENT)
Dept: GENERAL RADIOLOGY | Facility: CLINIC | Age: 73
End: 2023-09-05
Attending: FAMILY MEDICINE
Payer: MEDICARE

## 2023-09-05 VITALS
SYSTOLIC BLOOD PRESSURE: 134 MMHG | HEART RATE: 71 BPM | BODY MASS INDEX: 38.65 KG/M2 | RESPIRATION RATE: 18 BRPM | TEMPERATURE: 98.2 F | WEIGHT: 232 LBS | OXYGEN SATURATION: 98 % | HEIGHT: 65 IN | DIASTOLIC BLOOD PRESSURE: 78 MMHG

## 2023-09-05 DIAGNOSIS — R06.09 DOE (DYSPNEA ON EXERTION): ICD-10-CM

## 2023-09-05 DIAGNOSIS — N64.4 BREAST PAIN, LEFT: ICD-10-CM

## 2023-09-05 DIAGNOSIS — R07.9 CHEST PAIN, UNSPECIFIED TYPE: ICD-10-CM

## 2023-09-05 DIAGNOSIS — R07.9 CHEST PAIN, UNSPECIFIED TYPE: Primary | ICD-10-CM

## 2023-09-05 LAB
ALBUMIN SERPL BCG-MCNC: 4.2 G/DL (ref 3.5–5.2)
ALP SERPL-CCNC: 82 U/L (ref 35–104)
ALT SERPL W P-5'-P-CCNC: 14 U/L (ref 0–50)
ANION GAP SERPL CALCULATED.3IONS-SCNC: 10 MMOL/L (ref 7–15)
AST SERPL W P-5'-P-CCNC: 24 U/L (ref 0–45)
ATRIAL RATE - MUSE: 74 BPM
BASOPHILS # BLD AUTO: 0 10E3/UL (ref 0–0.2)
BASOPHILS NFR BLD AUTO: 0 %
BILIRUB SERPL-MCNC: 0.3 MG/DL
BUN SERPL-MCNC: 13.5 MG/DL (ref 8–23)
CALCIUM SERPL-MCNC: 9.8 MG/DL (ref 8.8–10.2)
CHLORIDE SERPL-SCNC: 100 MMOL/L (ref 98–107)
CREAT SERPL-MCNC: 0.87 MG/DL (ref 0.51–0.95)
DEPRECATED HCO3 PLAS-SCNC: 28 MMOL/L (ref 22–29)
DIASTOLIC BLOOD PRESSURE - MUSE: NORMAL MMHG
EOSINOPHIL # BLD AUTO: 0.1 10E3/UL (ref 0–0.7)
EOSINOPHIL NFR BLD AUTO: 2 %
ERYTHROCYTE [DISTWIDTH] IN BLOOD BY AUTOMATED COUNT: 12.6 % (ref 10–15)
GFR SERPL CREATININE-BSD FRML MDRD: 70 ML/MIN/1.73M2
GLUCOSE SERPL-MCNC: 129 MG/DL (ref 70–99)
HCT VFR BLD AUTO: 41.4 % (ref 35–47)
HGB BLD-MCNC: 13.7 G/DL (ref 11.7–15.7)
IMM GRANULOCYTES # BLD: 0 10E3/UL
IMM GRANULOCYTES NFR BLD: 0 %
INTERPRETATION ECG - MUSE: NORMAL
LYMPHOCYTES # BLD AUTO: 2.1 10E3/UL (ref 0.8–5.3)
LYMPHOCYTES NFR BLD AUTO: 24 %
MCH RBC QN AUTO: 28.2 PG (ref 26.5–33)
MCHC RBC AUTO-ENTMCNC: 33.1 G/DL (ref 31.5–36.5)
MCV RBC AUTO: 85 FL (ref 78–100)
MONOCYTES # BLD AUTO: 0.5 10E3/UL (ref 0–1.3)
MONOCYTES NFR BLD AUTO: 6 %
NEUTROPHILS # BLD AUTO: 6 10E3/UL (ref 1.6–8.3)
NEUTROPHILS NFR BLD AUTO: 68 %
P AXIS - MUSE: 2 DEGREES
PLATELET # BLD AUTO: 232 10E3/UL (ref 150–450)
POTASSIUM SERPL-SCNC: 4.5 MMOL/L (ref 3.4–5.3)
PR INTERVAL - MUSE: 116 MS
PROT SERPL-MCNC: 7.4 G/DL (ref 6.4–8.3)
QRS DURATION - MUSE: 74 MS
QT - MUSE: 372 MS
QTC - MUSE: 412 MS
R AXIS - MUSE: 3 DEGREES
RBC # BLD AUTO: 4.85 10E6/UL (ref 3.8–5.2)
SODIUM SERPL-SCNC: 138 MMOL/L (ref 136–145)
SYSTOLIC BLOOD PRESSURE - MUSE: NORMAL MMHG
T AXIS - MUSE: 13 DEGREES
VENTRICULAR RATE- MUSE: 74 BPM
WBC # BLD AUTO: 8.8 10E3/UL (ref 4–11)

## 2023-09-05 PROCEDURE — 93010 ELECTROCARDIOGRAM REPORT: CPT | Mod: OFF | Performed by: INTERNAL MEDICINE

## 2023-09-05 PROCEDURE — 80053 COMPREHEN METABOLIC PANEL: CPT | Performed by: FAMILY MEDICINE

## 2023-09-05 PROCEDURE — 93005 ELECTROCARDIOGRAM TRACING: CPT | Performed by: FAMILY MEDICINE

## 2023-09-05 PROCEDURE — 99213 OFFICE O/P EST LOW 20 MIN: CPT | Performed by: FAMILY MEDICINE

## 2023-09-05 PROCEDURE — 36415 COLL VENOUS BLD VENIPUNCTURE: CPT | Performed by: FAMILY MEDICINE

## 2023-09-05 PROCEDURE — 71046 X-RAY EXAM CHEST 2 VIEWS: CPT | Mod: TC | Performed by: RADIOLOGY

## 2023-09-05 PROCEDURE — 85025 COMPLETE CBC W/AUTO DIFF WBC: CPT | Performed by: FAMILY MEDICINE

## 2023-09-05 ASSESSMENT — PAIN SCALES - GENERAL: PAINLEVEL: NO PAIN (0)

## 2023-09-05 NOTE — PROGRESS NOTES
"Susan Contreras  /78   Pulse 71   Temp 98.2  F (36.8  C)   Resp 18   Ht 1.638 m (5' 4.5\")   Wt 105.2 kg (232 lb)   LMP  (LMP Unknown)   SpO2 98%   BMI 39.21 kg/m       Assessment/Plan:                Susan was seen today for radiology visit and recheck medication.    Diagnoses and all orders for this visit:    Chest pain, unspecified type  -     XR Chest 2 Views; Future  -     EKG 12-lead, tracing only  -     MA Diagnostic Digital Bilateral; Future  -     Comprehensive metabolic panel  -     CBC with Platelets & Differential    Breast pain, left  -     MA Diagnostic Digital Bilateral; Future    RUSSELL (dyspnea on exertion)         DISCUSSION  unclear as to cause of chest pain. Does not seem to be within the breast tissue itself but will order diagnostic mammogram. Given her history of dyspnea on exertion will obtain labs, EKG and chest x-ray. We give strong consideration to pursuing a stress test she initially declines this overall if she has had in the past but states she will think about it further at my urging. We'll obtain additional information first and then make further plans.  Subjective:     HPI:    Susan Contreras is a 73 year old female has a medical history includes chronic back pain, acid reflux, osteoarthritis, diverticulosis family history of colon cancer.    She is here today concerned because she tried to schedule mammogram and stated she had chest pain recently which prompted the need for diagnostic mammogram.    She is a long-standing history of intermittent upper chest pain. It is been thought to be musculoskeletal but she has been recommended have a stress test which she is declined to do. Patient reports her chest pain symptoms are relatively stable he do tend to come and go there is a component that suggests it is musculoskeletal but it is overall uncertain. Pain is located in the left upper chest. Patient also reports dyspnea on exertion. Chest pain does not occur " reproducibility with exertion but dyspnea does. Patient admits to being relatively physically and active. She has hyperlipidemia but reasonable blood pressures. Uncertain about family history of vascular disease at this time.    She is a history of a cystic structure in the left axilla which has been present chronically it is not resolved nor would be expected to do so but it is not inflamed requiring any emergent evaluation or treatment either.    Patient does report being under significant mental stress recently.    Patient is concerned because she has a sister had breast cancer. Also mother had lymphoma that presented in the upper chest region.    Other than headache which she plans of currently that is consistent with history of migraine headaches she does not report any other significant concerning symptoms.    ROS:  Complete review of systems is obtained.  Other than the specific considerations noted above complete review of systems is negative.    Objective:   Medications:  No current outpatient medications on file.     No current facility-administered medications for this visit.        Allergies:  Allergies   Allergen Reactions    Acetaminophen Unknown    Codeine Nausea and Vomiting    House Dust [Dust Mite Extract] Unknown    Hydrocodone Bitartrate [Hydrocodone] Unknown    Hydrocodone-Acetaminophen Unknown     Abdominal pain      Latex Unknown        Social History     Socioeconomic History    Marital status:      Spouse name: Not on file    Number of children: Not on file    Years of education: Not on file    Highest education level: Not on file   Occupational History    Not on file   Tobacco Use    Smoking status: Never    Smokeless tobacco: Never   Vaping Use    Vaping Use: Never used   Substance and Sexual Activity    Alcohol use: Not on file    Drug use: Not on file    Sexual activity: Not on file   Other Topics Concern    Not on file   Social History Narrative    Not on file     Social  "Determinants of Health     Financial Resource Strain: Not on file   Food Insecurity: Not on file   Transportation Needs: Not on file   Physical Activity: Not on file   Stress: Not on file   Social Connections: Not on file   Intimate Partner Violence: Not on file   Housing Stability: Not on file       Family History   Problem Relation Age of Onset    Heart Disease Mother     Lymphoma Mother     Diabetes Father         Most Recent Immunizations   Administered Date(s) Administered    COVID-19 Bivalent 12+ (Pfizer) 12/14/2022    COVID-19 MONOVALENT 12+ (Pfizer) 11/17/2021    Influenza Vaccine 65+ (Fluzone HD) 11/14/2022    TDAP (Adacel,Boostrix) 09/04/2018        Wt Readings from Last 3 Encounters:   09/05/23 105.2 kg (232 lb)   01/14/23 105.7 kg (233 lb 1.6 oz)   10/25/22 102.6 kg (226 lb 4.8 oz)        BP Readings from Last 6 Encounters:   09/05/23 134/78   01/14/23 (!) 151/72   10/25/22 132/76   09/16/22 130/80   05/17/21 126/74   02/07/20 106/68        Hemoglobin A1C   Date Value Ref Range Status   11/14/2022 6.0 (H) 0.0 - 5.6 % Final     Comment:     Normal <5.7%   Prediabetes 5.7-6.4%    Diabetes 6.5% or higher     Note: Adopted from ADA consensus guidelines.        PHYSICAL EXAM:    /78   Pulse 71   Temp 98.2  F (36.8  C)   Resp 18   Ht 1.638 m (5' 4.5\")   Wt 105.2 kg (232 lb)   LMP  (LMP Unknown)   SpO2 98%   BMI 39.21 kg/m           General Appearance:    Alert, cooperative, no distress   Eyes:   No scleral icterus or conjunctival irritation       Lungs:     Clear to auscultation bilaterally, respirations unlabored, no wheezes or crackles, palpation the chest wall does elicit some tenderness in the left upper chest patient is not completely clear as to whether this is distinctly the discomfort that she feels or if it is different.   Heart:    Regular rate and rhythm,  No murmur   Extremities:  No edema, no joint swelling or redness, no evidence of any injuries   Skin:  No concerning skin findings, " no suspicious moles, no rashes   Neurologic:  On gross examination there is no motor or sensory deficit.  Patient walks with a normal gait

## 2023-09-20 ENCOUNTER — ANCILLARY PROCEDURE (OUTPATIENT)
Dept: MAMMOGRAPHY | Facility: CLINIC | Age: 73
End: 2023-09-20
Attending: FAMILY MEDICINE
Payer: MEDICARE

## 2023-09-20 DIAGNOSIS — R07.9 CHEST PAIN, UNSPECIFIED TYPE: ICD-10-CM

## 2023-09-20 DIAGNOSIS — N64.4 BREAST PAIN, LEFT: ICD-10-CM

## 2023-09-20 PROCEDURE — 77062 BREAST TOMOSYNTHESIS BI: CPT

## 2023-10-16 ENCOUNTER — OFFICE VISIT (OUTPATIENT)
Dept: FAMILY MEDICINE | Facility: CLINIC | Age: 73
End: 2023-10-16
Payer: MEDICARE

## 2023-10-16 VITALS
HEART RATE: 69 BPM | TEMPERATURE: 98 F | BODY MASS INDEX: 38.99 KG/M2 | DIASTOLIC BLOOD PRESSURE: 80 MMHG | SYSTOLIC BLOOD PRESSURE: 136 MMHG | HEIGHT: 65 IN | OXYGEN SATURATION: 99 % | RESPIRATION RATE: 18 BRPM | WEIGHT: 234 LBS

## 2023-10-16 DIAGNOSIS — N95.0 POST-MENOPAUSAL BLEEDING: ICD-10-CM

## 2023-10-16 DIAGNOSIS — Z12.11 SCREEN FOR COLON CANCER: ICD-10-CM

## 2023-10-16 DIAGNOSIS — R06.09 DOE (DYSPNEA ON EXERTION): ICD-10-CM

## 2023-10-16 DIAGNOSIS — F43.29 ADJUSTMENT DISORDER WITH MIXED EMOTIONAL FEATURES: ICD-10-CM

## 2023-10-16 DIAGNOSIS — Z00.00 MEDICARE ANNUAL WELLNESS VISIT, SUBSEQUENT: Primary | ICD-10-CM

## 2023-10-16 DIAGNOSIS — R73.03 PREDIABETES: ICD-10-CM

## 2023-10-16 DIAGNOSIS — E78.2 MIXED HYPERLIPIDEMIA: ICD-10-CM

## 2023-10-16 DIAGNOSIS — R07.9 CHEST PAIN, UNSPECIFIED TYPE: ICD-10-CM

## 2023-10-16 DIAGNOSIS — R63.5 WEIGHT GAIN: ICD-10-CM

## 2023-10-16 PROCEDURE — 90662 IIV NO PRSV INCREASED AG IM: CPT | Performed by: FAMILY MEDICINE

## 2023-10-16 PROCEDURE — 99213 OFFICE O/P EST LOW 20 MIN: CPT | Mod: 25 | Performed by: FAMILY MEDICINE

## 2023-10-16 PROCEDURE — G0439 PPPS, SUBSEQ VISIT: HCPCS | Performed by: FAMILY MEDICINE

## 2023-10-16 PROCEDURE — 90471 IMMUNIZATION ADMIN: CPT | Performed by: FAMILY MEDICINE

## 2023-10-16 ASSESSMENT — ACTIVITIES OF DAILY LIVING (ADL)
CONCENTRATING,_REMEMBERING_OR_MAKING_DECISIONS_DIFFICULTY: NO
CURRENT_FUNCTION: NO ASSISTANCE NEEDED
WEAR_GLASSES_OR_BLIND: YES
DIFFICULTY_COMMUNICATING: NO
FALL_HISTORY_WITHIN_LAST_SIX_MONTHS: NO
DOING_ERRANDS_INDEPENDENTLY_DIFFICULTY: NO
DRESSING/BATHING_DIFFICULTY: NO
DIFFICULTY_EATING/SWALLOWING: NO
TOILETING_ISSUES: NO
HEARING_DIFFICULTY_OR_DEAF: NO
WALKING_OR_CLIMBING_STAIRS_DIFFICULTY: NO
CHANGE_IN_FUNCTIONAL_STATUS_SINCE_ONSET_OF_CURRENT_ILLNESS/INJURY: NO

## 2023-10-16 ASSESSMENT — ENCOUNTER SYMPTOMS
HEARTBURN: 0
PALPITATIONS: 0
FEVER: 0
SORE THROAT: 0
NERVOUS/ANXIOUS: 0
SHORTNESS OF BREATH: 1
HEMATURIA: 0
MYALGIAS: 0
FREQUENCY: 1
JOINT SWELLING: 0
EYE PAIN: 1
COUGH: 0
DIARRHEA: 1
HEMATOCHEZIA: 1
DIZZINESS: 0
WEAKNESS: 0
ARTHRALGIAS: 1
CHILLS: 0
DYSURIA: 1
HEADACHES: 0
BREAST MASS: 0
ABDOMINAL PAIN: 0
NAUSEA: 0

## 2023-10-16 ASSESSMENT — PAIN SCALES - GENERAL: PAINLEVEL: MODERATE PAIN (4)

## 2023-10-16 NOTE — PROGRESS NOTES
SUBJECTIVE:   Susan is a 73 year old who presents for Preventive Visit.    Today we discussed multiple considerations including routine health preventive measures.    She does have a colonoscopy scheduled. She recently completed mammogram. She did [place] can in 2021 that was normal. Today we discussed immunizations and elected to obtain flu shot.    She does report more acute back pain this is a flareup of her chronic condition. No red flags symptoms are identified.    She is prediabetes we will recheck close a fasting blood sugar and A1c when she returns for fasting labs at a later date. Body mass index is 39 she is at higher risk for diabetes. Discussed nutrition and exercise today.    She has chronic chest pain that we touched on at her last visit in September. Chest pain is highly likely to be musculoskeletal. We did discuss given vascular disease risks that we should give consideration to obtaining a stress test you must contemplate this further.    She is a history of postmenopausal imaginal bleeding in 2013. Patient states her last menstrual period was at age 43 around 2013 she had bleeding which prompted evaluation by gynecology. I did review the pathology report confirming it was benign. Since that time patient reports she is not had issues but more recently reports bleeding, she states she is uncertain if this is hemorrhoidal bleeding or maybe vaginally. She does not report any other significant concerns and we discussed today having her visit with gynecologist again given the uncertainty regarding her situation.    She does report a lot of increase stress due to difficulties that her adult son and her grandson had. 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 Patient also states that her ex- who she had not seen in over 40 years has recently reconnected and informed her he has leukemia. She has had some limited contact with him and finds this to be very emotional. We discussed this briefly  "today. We discussed the benefits of having her talk with the therapist as she navigates situation.    Are you in the first 12 months of your Medicare coverage?  No    Have you ever done Advance Care Planning? (For example, a Health Directive, POLST, or a discussion with a medical provider or your loved ones about your wishes): No, advance care planning information given to patient to review.  Advanced care planning was discussed at today's visit.    Fallen 2 or more times in the past year?: No  Any fall with injury in the past year?: No    Cognitive Screening Answers submitted by the patient for this visit:    Annual Preventive Visit (Submitted on 10/16/2023)  Chief Complaint: Annual Exam:  In general, how would you rate your overall physical health?: good  Frequency of exercise:: None  Do you usually eat at least 4 servings of fruit and vegetables a day, include whole grains & fiber, and avoid regularly eating high fat or \"junk\" foods? : No  Taking medications regularly:: Not Applicable  Medication side effects:: Not applicable  Activities of Daily Living: no assistance needed  Home safety: no safety concerns identified  Hearing Impairment:: no hearing concerns  In the past 6 months, have you been bothered by leaking of urine?: No  In general, how would you rate your overall mental or emotional health?: good  Additional concerns today:: No    1) Repeat 3 items 3/3   2) Clock draw: NORMAL  3) 3 item recall: Recalls 3 objects  Results: 3 items recalled: COGNITIVE IMPAIRMENT LESS LIKELY    Mini-CogTM Copyright CHAD Xie. Licensed by the author for use in [facility]; reprinted with permission (clemente@.[facility]). All rights reserved.      Do you have sleep apnea, excessive snoring or daytime drowsiness? : possible sleep apnea    Reviewed and updated as needed this visit by clinical staff    Reviewed and updated as needed this visit by Provider    Social History     Tobacco Use    Smoking status: Never    " "Smokeless tobacco: Never   Substance Use Topics    Alcohol use: Not on file             10/16/2023    10:28 AM   Alcohol Use   Prescreen: >3 drinks/day or >7 drinks/week? Not Applicable          No data to display              Do you have a current opioid prescription? No  Do you use any other controlled substances or medications that are not prescribed by a provider? None      Current providers sharing in care for this patient include:   Patient Care Team:  Jerod Schaeffer MD as PCP - General  Jerod Schaeffer MD as Assigned PCP  Jelani Larios DPM as Assigned Surgical Provider    The following health maintenance items are reviewed in Epic and correct as of today:  Health Maintenance   Topic Date Due    ANNUAL REVIEW OF HM ORDERS  Never done    ZOSTER IMMUNIZATION (1 of 2) Never done    RSV VACCINE 60+ (1 - 1-dose 60+ series) Never done    Pneumococcal Vaccine: 65+ Years (1 - PCV) Never done    COLORECTAL CANCER SCREENING  05/21/2023    COVID-19 Vaccine (5 - 2023-24 season) 09/01/2023    MEDICARE ANNUAL WELLNESS VISIT  10/25/2023    FALL RISK ASSESSMENT  10/16/2024    MAMMO SCREENING  09/20/2025    LIPID  11/14/2027    DTAP/TDAP/TD IMMUNIZATION (2 - Td or Tdap) 09/04/2028    ADVANCE CARE PLANNING  10/16/2028    DEXA  08/03/2036    HEPATITIS C SCREENING  Completed    PHQ-2 (once per calendar year)  Completed    INFLUENZA VACCINE  Completed    IPV IMMUNIZATION  Aged Out    HPV IMMUNIZATION  Aged Out    MENINGITIS IMMUNIZATION  Aged Out       Review of Systems  Complete review of systems is obtained.  Other than the specific considerations noted above complete review of systems is negative.      OBJECTIVE:   /80   Pulse 69   Temp 98  F (36.7  C)   Resp 18   Ht 1.638 m (5' 4.5\")   Wt 106.1 kg (234 lb)   LMP  (LMP Unknown)   SpO2 99%   BMI 39.55 kg/m   Estimated body mass index is 39.55 kg/m  as calculated from the following:    Height as of this encounter: 1.638 m (5' 4.5\").    Weight as of this " encounter: 106.1 kg (234 lb).  Physical Exam        General Appearance:    Alert, cooperative, no distress   Eyes:   No scleral icterus or conjunctival irritation       Ears:    Normal TM's and external ear canals, both ears   Throat:   Lips, mucosa, and tongue normal; teeth and gums normal   Neck:   Supple, symmetrical, trachea midline, no adenopathy;        thyroid:  No enlargement/tenderness/nodules   Lungs:     Clear to auscultation bilaterally, respirations unlabored, no wheezes or crackles   Heart:    Regular rate and rhythm,  No murmur   Abdomen:    Soft, no distention, no tenderness on palpation, no masses, no organomegaly     Extremities:  No edema, no joint swelling or redness, no evidence of any injuries   Skin:  No concerning skin findings, no suspicious moles, no rashes   Neurologic:  On gross examination there is no motor or sensory deficit.  Patient walks with a normal gait             ASSESSMENT / PLAN:   Susan was seen today for recheck medication and wellness visit.    Diagnoses and all orders for this visit:    Medicare annual wellness visit, subsequent    Screen for colon cancer    Chest pain, unspecified type    RUSSELL (dyspnea on exertion)    Mixed hyperlipidemia  -     Lipid panel reflex to direct LDL Fasting; Future    Prediabetes  -     Hemoglobin A1c; Future  -     Glucose; Future    Weight gain  -     TSH with free T4 reflex; Future    Post-menopausal bleeding  -     Cancel: Ob/Gyn  Referral; Future  -     Ob/Gyn  Referral; Future    Adjustment disorder with mixed emotional features  -     Adult Mental Health  Referral; Future    Other orders  -     INFLUENZA VACCINE 65+ (FLUZONE HD)           Patient has been advised of split billing requirements and indicates understanding: Yes      COUNSELING:  Reviewed preventive health counseling, as reflected in patient instructions       Regular exercise       Healthy diet/nutrition       Vision screening       Dental  "care       Colon cancer screening      BMI:   Estimated body mass index is 39.55 kg/m  as calculated from the following:    Height as of this encounter: 1.638 m (5' 4.5\").    Weight as of this encounter: 106.1 kg (234 lb).   Weight management plan: Discussed healthy diet and exercise guidelines    Wt Readings from Last 3 Encounters:   10/16/23 106.1 kg (234 lb)   09/05/23 105.2 kg (232 lb)   01/14/23 105.7 kg (233 lb 1.6 oz)        BP Readings from Last 6 Encounters:   10/16/23 136/80   09/05/23 134/78   01/14/23 (!) 151/72   10/25/22 132/76   09/16/22 130/80   05/17/21 126/74        Hemoglobin A1C   Date Value Ref Range Status   11/14/2022 6.0 (H) 0.0 - 5.6 % Final     Comment:     Normal <5.7%   Prediabetes 5.7-6.4%    Diabetes 6.5% or higher     Note: Adopted from ADA consensus guidelines.        She reports that she has never smoked. She has never used smokeless tobacco.      Appropriate preventive services were discussed with this patient, including applicable screening as appropriate for fall prevention, nutrition, physical activity, Tobacco-use cessation, weight loss and cognition.  Checklist reviewing preventive services available has been given to the patient.    Reviewed patients plan of care and provided an AVS. The Basic Care Plan (routine screening as documented in Health Maintenance) for Susan meets the Care Plan requirement. This Care Plan has been established and reviewed with the Patient.        Jerod Schaeffer MD, MD  [facility]    Identified Health Risks:    "

## 2023-10-24 ENCOUNTER — TRANSFERRED RECORDS (OUTPATIENT)
Dept: HEALTH INFORMATION MANAGEMENT | Facility: CLINIC | Age: 73
End: 2023-10-24
Payer: MEDICARE

## 2023-10-26 ENCOUNTER — LAB (OUTPATIENT)
Dept: LAB | Facility: CLINIC | Age: 73
End: 2023-10-26
Payer: MEDICARE

## 2023-10-26 DIAGNOSIS — R63.5 WEIGHT GAIN: ICD-10-CM

## 2023-10-26 DIAGNOSIS — E78.2 MIXED HYPERLIPIDEMIA: ICD-10-CM

## 2023-10-26 DIAGNOSIS — R73.03 PREDIABETES: ICD-10-CM

## 2023-10-26 LAB
CHOLEST SERPL-MCNC: 213 MG/DL
FASTING STATUS PATIENT QL REPORTED: YES
GLUCOSE SERPL-MCNC: 112 MG/DL (ref 70–99)
HBA1C MFR BLD: 6.3 % (ref 0–5.6)
HDLC SERPL-MCNC: 53 MG/DL
LDLC SERPL CALC-MCNC: 141 MG/DL
NONHDLC SERPL-MCNC: 160 MG/DL
TRIGL SERPL-MCNC: 96 MG/DL
TSH SERPL DL<=0.005 MIU/L-ACNC: 2.8 UIU/ML (ref 0.3–4.2)

## 2023-10-26 PROCEDURE — 36415 COLL VENOUS BLD VENIPUNCTURE: CPT

## 2023-10-26 PROCEDURE — 83036 HEMOGLOBIN GLYCOSYLATED A1C: CPT

## 2023-10-26 PROCEDURE — 84443 ASSAY THYROID STIM HORMONE: CPT

## 2023-10-26 PROCEDURE — 80061 LIPID PANEL: CPT

## 2023-10-26 PROCEDURE — 82947 ASSAY GLUCOSE BLOOD QUANT: CPT

## 2023-11-13 ENCOUNTER — OFFICE VISIT (OUTPATIENT)
Dept: FAMILY MEDICINE | Facility: CLINIC | Age: 73
End: 2023-11-13
Payer: MEDICARE

## 2023-11-13 VITALS
OXYGEN SATURATION: 98 % | HEIGHT: 65 IN | DIASTOLIC BLOOD PRESSURE: 78 MMHG | SYSTOLIC BLOOD PRESSURE: 134 MMHG | BODY MASS INDEX: 38.99 KG/M2 | TEMPERATURE: 97.6 F | WEIGHT: 234 LBS | RESPIRATION RATE: 18 BRPM | HEART RATE: 75 BPM

## 2023-11-13 DIAGNOSIS — Z01.818 PREOP GENERAL PHYSICAL EXAM: Primary | ICD-10-CM

## 2023-11-13 DIAGNOSIS — E78.2 MIXED HYPERLIPIDEMIA: ICD-10-CM

## 2023-11-13 DIAGNOSIS — N95.0 POST-MENOPAUSAL BLEEDING: ICD-10-CM

## 2023-11-13 DIAGNOSIS — R73.03 PREDIABETES: ICD-10-CM

## 2023-11-13 DIAGNOSIS — K21.9 GASTROESOPHAGEAL REFLUX DISEASE WITHOUT ESOPHAGITIS: ICD-10-CM

## 2023-11-13 PROBLEM — R73.9 HYPERGLYCEMIA: Status: ACTIVE | Noted: 2023-11-13

## 2023-11-13 LAB
ERYTHROCYTE [DISTWIDTH] IN BLOOD BY AUTOMATED COUNT: 12.6 % (ref 10–15)
HCT VFR BLD AUTO: 42.9 % (ref 35–47)
HGB BLD-MCNC: 14.1 G/DL (ref 11.7–15.7)
HOLD SPECIMEN: NORMAL
HOLD SPECIMEN: NORMAL
MCH RBC QN AUTO: 28 PG (ref 26.5–33)
MCHC RBC AUTO-ENTMCNC: 32.9 G/DL (ref 31.5–36.5)
MCV RBC AUTO: 85 FL (ref 78–100)
PLATELET # BLD AUTO: 232 10E3/UL (ref 150–450)
RBC # BLD AUTO: 5.04 10E6/UL (ref 3.8–5.2)
WBC # BLD AUTO: 7.8 10E3/UL (ref 4–11)

## 2023-11-13 PROCEDURE — 36415 COLL VENOUS BLD VENIPUNCTURE: CPT | Performed by: FAMILY MEDICINE

## 2023-11-13 PROCEDURE — 99214 OFFICE O/P EST MOD 30 MIN: CPT | Performed by: FAMILY MEDICINE

## 2023-11-13 PROCEDURE — 85027 COMPLETE CBC AUTOMATED: CPT | Performed by: FAMILY MEDICINE

## 2023-11-13 ASSESSMENT — PAIN SCALES - GENERAL: PAINLEVEL: NO PAIN (0)

## 2023-11-13 NOTE — H&P (VIEW-ONLY)
[facility]  [address]  [place] [address]  Phone: [phone]  Fax: [phone]  Primary Provider: Dinora Espinoza  Pre-op Performing Provider: DINORA ESPINOZA      PREOPERATIVE EVALUATION:  Today's date: 11/13/2023    Susan is a 73 year old female who presents for a preoperative evaluation.      Surgical Information:  Surgery/Procedure: DN   Surgery Location: [facility]  Surgeon: Dr Pompa  Surgery Date: 11/27/23  Time of Surgery: 10:30 am  Where patient plans to recover: At home with family  Fax number for surgical facility: Note does not need to be faxed, will be available electronically in Epic. [phone]    Assessment & Plan     The proposed surgical procedure is considered INTERMEDIATE risk.    Susan was seen today for recheck medication, pre-op exam and sweats.    Diagnoses and all orders for this visit:    Preop general physical exam  -     CBC with platelets  -     Extra Tube    Prediabetes    Mixed hyperlipidemia    Gastroesophageal reflux disease without esophagitis    Post-menopausal bleeding    Other orders  -     Extra Tube; Future  -     Extra Tube        - No identified additional risk factors other than previously addressed    Antiplatelet or Anticoagulation Medication Instructions:   - Patient is on no antiplatelet or anticoagulation medications.    Additional Medication Instructions:  Patient is on no additional chronic medications    RECOMMENDATION:  APPROVAL GIVEN to proceed with proposed procedure, without further diagnostic evaluation.      Subjective       HPI related to upcoming procedure:     At a recent routine health preventive visit she described concerns regarding postmenopausal bleeding, she was sent to gynecology for further evaluation and is now scheduled for the above stated procedure.    She has a prior history of postmenopausal bleeding which has been evaluated without any concerning etiology being  identified.    Body mass index is 39.5. She is prediabetes with A1c of 6.3 obtained on October 26, 2023.    She does not have any history of vascular disease or pulmonary disease. She does deal with some chronic back pain. She has had significant stress in her life which she is working to manage.    She describes no signs or symptoms of an acute infection.    She does not report any significant prior history of complications related to anesthesia. She does not have a history of a blood clot or bleeding disorder.              11/13/2023     7:36 AM   Preop Questions   1. Have you ever had a heart attack or stroke? No   2. Have you ever had surgery on your heart or blood vessels, such as a stent placement, a coronary artery bypass, or surgery on an artery in your head, neck, heart, or legs? No   3. Do you have chest pain with activity? MSK pain - no concern for cardiac disease based on history and clinical course   4. Do you have a history of  heart failure? No   5. Do you currently have a cold, bronchitis or symptoms of other infection? No   6. Do you have a cough, shortness of breath, or wheezing? No   7. Do you or anyone in your family have previous history of blood clots? No   8. Do you or does anyone in your family have a serious bleeding problem such as prolonged bleeding following surgeries or cuts? No   9. Have you ever had problems with anemia or been told to take iron pills? YES - yes history of iron pill supplementation in 1979 for prev DNC surgery   10. Have you had any abnormal blood loss such as black, tarry or bloody stools, or abnormal vaginal bleeding? YES - hx of epistaxis, recent postmenopausal bleeding concern as described   11. Have you ever had a blood transfusion? No   12. Are you willing to have a blood transfusion if it is medically needed before, during, or after your surgery? Yes   13. Have you or any of your relatives ever had problems with anesthesia? No known history of blood clot   14.  Do you have sleep apnea, excessive snoring or daytime drowsiness? Possibly Snores, some degree of falling asleep during the day - needs consideration for further evaluation, but not any definitive concern identified   15. Do you have any artifical heart valves or other implanted medical devices like a pacemaker, defibrillator, or continuous glucose monitor? No   16. Do you have artificial joints? No   17. Are you allergic to latex? YES: SKIN SENSITIVITY ONLY       Health Care Directive:  Patient does not have a Health Care Directive or Living Will:     Preoperative Review of :   reviewed - no record of controlled substances prescribed.      Status of Chronic Conditions:  See problem list for active medical problems.  Problems all longstanding and stable, except as noted/documented.  See ROS for pertinent symptoms related to these conditions.    Review of Systems  Complete review of systems is obtained.  Other than the specific considerations noted above complete review of systems is negative.      Patient Active Problem List    Diagnosis Date Noted    Hyperglycemia 11/13/2023     Priority: Medium    Obesity (BMI 35.0-39.9) with comorbidity (H) 05/17/2021     Priority: Medium    Headache      Priority: Medium     Created by Conversion        Hemorrhoids 09/04/2018     Priority: Medium    History of colonic polyps 05/23/2018     Priority: Medium    Polyp of colon 05/21/2018     Priority: Medium    Arthritis      Priority: Medium     Created by Conversion  Replacement Utility updated for latest IMO load        Migraine 04/26/2016     Priority: Medium    Osteoarthritis 04/26/2016     Priority: Medium    Lump In / On The Skin  02/23/2015     Priority: Medium    Lower Back Pain      Priority: Medium     Created by Conversion        Esophageal Reflux      Priority: Medium     Created by Conversion        Hyperhidrosis      Priority: Medium     Created by Conversion        Dysfunctional Uterine Bleeding      Priority:  "Medium     Created by Conversion        Hyperglycemia      Priority: Medium     Created by Conversion        Diverticular disease of colon 02/27/2012     Priority: Medium    Benign neoplasm of colon 02/23/2012     Priority: Medium    Diverticulosis of colon 02/23/2012     Priority: Medium    Family history of malignant neoplasm of gastrointestinal tract 02/23/2012     Priority: Medium      Past Medical History:   Diagnosis Date    Arthritis      Past Surgical History:   Procedure Laterality Date    ARTHROSCOPY SHOULDER  1982    HC DILATION/CURETTAGE DIAG/THER NON OB      Description: Dilation And Curettage;  Recorded: 07/08/2014;    HC REMOVAL OF TONSILS,<13 Y/O      Description: Tonsillectomy;  Recorded: 07/08/2014;     No current outpatient medications on file.       Allergies   Allergen Reactions    Acetaminophen Unknown    Codeine Nausea and Vomiting    House Dust [Dust Mite Extract] Unknown    Hydrocodone Bitartrate [Hydrocodone] Unknown    Hydrocodone-Acetaminophen Unknown     Abdominal pain      Latex Unknown        Social History     Tobacco Use    Smoking status: Never    Smokeless tobacco: Never   Substance Use Topics    Alcohol use: Not on file     Family History   Problem Relation Age of Onset    Heart Disease Mother     Lymphoma Mother     Diabetes Father     Breast Cancer Sister 84    Ovarian Cancer No family hx of      History   Drug Use Not on file         Objective     /78   Pulse 75   Temp 97.6  F (36.4  C)   Resp 18   Ht 1.638 m (5' 4.5\")   Wt 106.1 kg (234 lb)   LMP  (LMP Unknown)   SpO2 98%   BMI 39.55 kg/m      Physical Exam    General Appearance:    Alert, cooperative, no distress   Eyes:   No scleral icterus or conjunctival irritation       Ears:    Normal TM's and external ear canals, both ears   Throat:   Lips, mucosa, and tongue normal; teeth and gums normal   Neck:   Supple, symmetrical, trachea midline, no adenopathy;        thyroid:  No enlargement/tenderness/nodules "   Lungs:     Clear to auscultation bilaterally, respirations unlabored, no wheezes or crackles   Heart:    Regular rate and rhythm,  No murmur   Abdomen:    Soft, no distention, no tenderness on palpation, no masses, no organomegaly     Extremities:  No edema, no joint swelling or redness, no evidence of any injuries   Skin:  No concerning skin findings, no suspicious moles, no rashes   Neurologic:  On gross examination there is no motor or sensory deficit.  Patient walks with a normal gait       Recent Labs   Lab Test 10/26/23  0731 09/05/23  1119 11/14/22  0838   HGB  --  13.7 14.0   PLT  --  232 216   NA  --  138 138   POTASSIUM  --  4.5 4.4   CR  --  0.87 0.89   A1C 6.3*  --  6.0*        Diagnostics:  Labs pending at this time.  Results will be reviewed when available.   No EKG this visit, completed in the last 90 days.    Recent Results (from the past 24 hour(s))   CBC with platelets    Collection Time: 11/13/23  8:32 AM   Result Value Ref Range    WBC Count 7.8 4.0 - 11.0 10e3/uL    RBC Count 5.04 3.80 - 5.20 10e6/uL    Hemoglobin 14.1 11.7 - 15.7 g/dL    Hematocrit 42.9 35.0 - 47.0 %    MCV 85 78 - 100 fL    MCH 28.0 26.5 - 33.0 pg    MCHC 32.9 31.5 - 36.5 g/dL    RDW 12.6 10.0 - 15.0 %    Platelet Count 232 150 - 450 10e3/uL      Hemoglobin A1C   Date Value Ref Range Status   10/26/2023 6.3 (H) 0.0 - 5.6 % Final     Comment:     Normal <5.7%   Prediabetes 5.7-6.4%    Diabetes 6.5% or higher     Note: Adopted from ADA consensus guidelines.        Revised Cardiac Risk Index (RCRI):  The patient has the following serious cardiovascular risks for perioperative complications:   - No serious cardiac risks = 0 points     RCRI Interpretation: 0 points: Class I (very low risk - 0.4% complication rate)         Signed Electronically by: Jerod Schaeffer MD, MD  Copy of this evaluation report is provided to requesting physician.

## 2023-11-13 NOTE — PROGRESS NOTES
[facility]  [address] [facility] [address]  [facility] [address]  Phone: [phone]  Fax: [phone]  Primary Provider: Dinora Espinoza  Pre-op Performing Provider: DINORA ESPINOZA      PREOPERATIVE EVALUATION:  Today's date: 11/13/2023    Susan is a 73 year old female who presents for a preoperative evaluation.      Surgical Information:  Surgery/Procedure: DN   Surgery Location: [facility]  Surgeon: Dr Pompa  Surgery Date: 11/27/23  Time of Surgery: 10:30 am  Where patient plans to recover: At home with family  Fax number for surgical facility: Note does not need to be faxed, will be available electronically in Epic. [phone]    Assessment & Plan     The proposed surgical procedure is considered INTERMEDIATE risk.    Susan was seen today for recheck medication, pre-op exam and sweats.    Diagnoses and all orders for this visit:    Preop general physical exam  -     CBC with platelets  -     Extra Tube    Prediabetes    Mixed hyperlipidemia    Gastroesophageal reflux disease without esophagitis    Post-menopausal bleeding    Other orders  -     Extra Tube; Future  -     Extra Tube        - No identified additional risk factors other than previously addressed    Antiplatelet or Anticoagulation Medication Instructions:   - Patient is on no antiplatelet or anticoagulation medications.    Additional Medication Instructions:  Patient is on no additional chronic medications    RECOMMENDATION:  APPROVAL GIVEN to proceed with proposed procedure, without further diagnostic evaluation.      Subjective       HPI related to upcoming procedure:     At a recent routine health preventive visit she described concerns regarding postmenopausal bleeding, she was sent to gynecology for further evaluation and is now scheduled for the above stated procedure.    She has a prior history of postmenopausal bleeding which has been evaluated without any concerning etiology being  identified.    Body mass index is 39.5. She is prediabetes with A1c of 6.3 obtained on October 26, 2023.    She does not have any history of vascular disease or pulmonary disease. She does deal with some chronic back pain. She has had significant stress in her life which she is working to manage.    She describes no signs or symptoms of an acute infection.    She does not report any significant prior history of complications related to anesthesia. She does not have a history of a blood clot or bleeding disorder.              11/13/2023     7:36 AM   Preop Questions   1. Have you ever had a heart attack or stroke? No   2. Have you ever had surgery on your heart or blood vessels, such as a stent placement, a coronary artery bypass, or surgery on an artery in your head, neck, heart, or legs? No   3. Do you have chest pain with activity? MSK pain - no concern for cardiac disease based on history and clinical course   4. Do you have a history of  heart failure? No   5. Do you currently have a cold, bronchitis or symptoms of other infection? No   6. Do you have a cough, shortness of breath, or wheezing? No   7. Do you or anyone in your family have previous history of blood clots? No   8. Do you or does anyone in your family have a serious bleeding problem such as prolonged bleeding following surgeries or cuts? No   9. Have you ever had problems with anemia or been told to take iron pills? YES - yes history of iron pill supplementation in 1979 for prev DNC surgery   10. Have you had any abnormal blood loss such as black, tarry or bloody stools, or abnormal vaginal bleeding? YES - hx of epistaxis, recent postmenopausal bleeding concern as described   11. Have you ever had a blood transfusion? No   12. Are you willing to have a blood transfusion if it is medically needed before, during, or after your surgery? Yes   13. Have you or any of your relatives ever had problems with anesthesia? No known history of blood clot   14.  Do you have sleep apnea, excessive snoring or daytime drowsiness? Possibly Snores, some degree of falling asleep during the day - needs consideration for further evaluation, but not any definitive concern identified   15. Do you have any artifical heart valves or other implanted medical devices like a pacemaker, defibrillator, or continuous glucose monitor? No   16. Do you have artificial joints? No   17. Are you allergic to latex? YES: SKIN SENSITIVITY ONLY       Health Care Directive:  Patient does not have a Health Care Directive or Living Will:     Preoperative Review of :   reviewed - no record of controlled substances prescribed.      Status of Chronic Conditions:  See problem list for active medical problems.  Problems all longstanding and stable, except as noted/documented.  See ROS for pertinent symptoms related to these conditions.    Review of Systems  Complete review of systems is obtained.  Other than the specific considerations noted above complete review of systems is negative.      Patient Active Problem List    Diagnosis Date Noted    Hyperglycemia 11/13/2023     Priority: Medium    Obesity (BMI 35.0-39.9) with comorbidity (H) 05/17/2021     Priority: Medium    Headache      Priority: Medium     Created by Conversion        Hemorrhoids 09/04/2018     Priority: Medium    History of colonic polyps 05/23/2018     Priority: Medium    Polyp of colon 05/21/2018     Priority: Medium    Arthritis      Priority: Medium     Created by Conversion  Replacement Utility updated for latest IMO load        Migraine 04/26/2016     Priority: Medium    Osteoarthritis 04/26/2016     Priority: Medium    Lump In / On The Skin  02/23/2015     Priority: Medium    Lower Back Pain      Priority: Medium     Created by Conversion        Esophageal Reflux      Priority: Medium     Created by Conversion        Hyperhidrosis      Priority: Medium     Created by Conversion        Dysfunctional Uterine Bleeding      Priority:  "Medium     Created by Conversion        Hyperglycemia      Priority: Medium     Created by Conversion        Diverticular disease of colon 02/27/2012     Priority: Medium    Benign neoplasm of colon 02/23/2012     Priority: Medium    Diverticulosis of colon 02/23/2012     Priority: Medium    Family history of malignant neoplasm of gastrointestinal tract 02/23/2012     Priority: Medium      Past Medical History:   Diagnosis Date    Arthritis      Past Surgical History:   Procedure Laterality Date    ARTHROSCOPY SHOULDER  1982    HC DILATION/CURETTAGE DIAG/THER NON OB      Description: Dilation And Curettage;  Recorded: 07/08/2014;    HC REMOVAL OF TONSILS,<11 Y/O      Description: Tonsillectomy;  Recorded: 07/08/2014;     No current outpatient medications on file.       Allergies   Allergen Reactions    Acetaminophen Unknown    Codeine Nausea and Vomiting    House Dust [Dust Mite Extract] Unknown    Hydrocodone Bitartrate [Hydrocodone] Unknown    Hydrocodone-Acetaminophen Unknown     Abdominal pain      Latex Unknown        Social History     Tobacco Use    Smoking status: Never    Smokeless tobacco: Never   Substance Use Topics    Alcohol use: Not on file     Family History   Problem Relation Age of Onset    Heart Disease Mother     Lymphoma Mother     Diabetes Father     Breast Cancer Sister 84    Ovarian Cancer No family hx of      History   Drug Use Not on file         Objective     /78   Pulse 75   Temp 97.6  F (36.4  C)   Resp 18   Ht 1.638 m (5' 4.5\")   Wt 106.1 kg (234 lb)   LMP  (LMP Unknown)   SpO2 98%   BMI 39.55 kg/m      Physical Exam    General Appearance:    Alert, cooperative, no distress   Eyes:   No scleral icterus or conjunctival irritation       Ears:    Normal TM's and external ear canals, both ears   Throat:   Lips, mucosa, and tongue normal; teeth and gums normal   Neck:   Supple, symmetrical, trachea midline, no adenopathy;        thyroid:  No enlargement/tenderness/nodules "   Lungs:     Clear to auscultation bilaterally, respirations unlabored, no wheezes or crackles   Heart:    Regular rate and rhythm,  No murmur   Abdomen:    Soft, no distention, no tenderness on palpation, no masses, no organomegaly     Extremities:  No edema, no joint swelling or redness, no evidence of any injuries   Skin:  No concerning skin findings, no suspicious moles, no rashes   Neurologic:  On gross examination there is no motor or sensory deficit.  Patient walks with a normal gait       Recent Labs   Lab Test 10/26/23  0731 09/05/23  1119 11/14/22  0838   HGB  --  13.7 14.0   PLT  --  232 216   NA  --  138 138   POTASSIUM  --  4.5 4.4   CR  --  0.87 0.89   A1C 6.3*  --  6.0*        Diagnostics:  Labs pending at this time.  Results will be reviewed when available.   No EKG this visit, completed in the last 90 days.    Recent Results (from the past 24 hour(s))   CBC with platelets    Collection Time: 11/13/23  8:32 AM   Result Value Ref Range    WBC Count 7.8 4.0 - 11.0 10e3/uL    RBC Count 5.04 3.80 - 5.20 10e6/uL    Hemoglobin 14.1 11.7 - 15.7 g/dL    Hematocrit 42.9 35.0 - 47.0 %    MCV 85 78 - 100 fL    MCH 28.0 26.5 - 33.0 pg    MCHC 32.9 31.5 - 36.5 g/dL    RDW 12.6 10.0 - 15.0 %    Platelet Count 232 150 - 450 10e3/uL      Hemoglobin A1C   Date Value Ref Range Status   10/26/2023 6.3 (H) 0.0 - 5.6 % Final     Comment:     Normal <5.7%   Prediabetes 5.7-6.4%    Diabetes 6.5% or higher     Note: Adopted from ADA consensus guidelines.        Revised Cardiac Risk Index (RCRI):  The patient has the following serious cardiovascular risks for perioperative complications:   - No serious cardiac risks = 0 points     RCRI Interpretation: 0 points: Class I (very low risk - 0.4% complication rate)         Signed Electronically by: Jerod Schaeffer MD, MD  Copy of this evaluation report is provided to requesting physician.

## 2023-11-22 ENCOUNTER — ANESTHESIA EVENT (OUTPATIENT)
Dept: SURGERY | Facility: AMBULATORY SURGERY CENTER | Age: 73
End: 2023-11-22
Payer: MEDICARE

## 2023-11-27 ENCOUNTER — ANESTHESIA (OUTPATIENT)
Dept: SURGERY | Facility: AMBULATORY SURGERY CENTER | Age: 73
End: 2023-11-27
Payer: MEDICARE

## 2023-11-27 ENCOUNTER — HOSPITAL ENCOUNTER (OUTPATIENT)
Facility: AMBULATORY SURGERY CENTER | Age: 73
Discharge: HOME | End: 2023-11-27
Attending: OBSTETRICS & GYNECOLOGY
Payer: MEDICARE

## 2023-11-27 VITALS
HEART RATE: 64 BPM | HEIGHT: 65 IN | BODY MASS INDEX: 38.99 KG/M2 | SYSTOLIC BLOOD PRESSURE: 127 MMHG | DIASTOLIC BLOOD PRESSURE: 60 MMHG | RESPIRATION RATE: 16 BRPM | TEMPERATURE: 96.2 F | WEIGHT: 234 LBS | OXYGEN SATURATION: 97 %

## 2023-11-27 DIAGNOSIS — Z98.890 STATUS POST HYSTEROSCOPY: Primary | ICD-10-CM

## 2023-11-27 DIAGNOSIS — N95.0 POSTMENOPAUSAL BLEEDING: ICD-10-CM

## 2023-11-27 RX ORDER — SODIUM CHLORIDE, SODIUM LACTATE, POTASSIUM CHLORIDE, CALCIUM CHLORIDE 600; 310; 30; 20 MG/100ML; MG/100ML; MG/100ML; MG/100ML
INJECTION, SOLUTION INTRAVENOUS CONTINUOUS
Status: DISCONTINUED | OUTPATIENT
Start: 2023-11-27 | End: 2023-11-28 | Stop reason: HOSPADM

## 2023-11-27 RX ORDER — SODIUM CHLORIDE, SODIUM LACTATE, POTASSIUM CHLORIDE, CALCIUM CHLORIDE 600; 310; 30; 20 MG/100ML; MG/100ML; MG/100ML; MG/100ML
INJECTION, SOLUTION INTRAVENOUS CONTINUOUS PRN
Status: DISCONTINUED | OUTPATIENT
Start: 2023-11-27 | End: 2023-11-27

## 2023-11-27 RX ORDER — DEXAMETHASONE SODIUM PHOSPHATE 4 MG/ML
INJECTION, SOLUTION INTRA-ARTICULAR; INTRALESIONAL; INTRAMUSCULAR; INTRAVENOUS; SOFT TISSUE PRN
Status: DISCONTINUED | OUTPATIENT
Start: 2023-11-27 | End: 2023-11-27

## 2023-11-27 RX ORDER — LIDOCAINE HYDROCHLORIDE 10 MG/ML
INJECTION, SOLUTION EPIDURAL; INFILTRATION; INTRACAUDAL; PERINEURAL PRN
Status: DISCONTINUED | OUTPATIENT
Start: 2023-11-27 | End: 2023-11-27 | Stop reason: HOSPADM

## 2023-11-27 RX ORDER — IBUPROFEN 600 MG/1
600 TABLET, FILM COATED ORAL EVERY 6 HOURS PRN
Qty: 30 TABLET | Refills: 0 | Status: SHIPPED | OUTPATIENT
Start: 2023-11-27

## 2023-11-27 RX ORDER — KETOROLAC TROMETHAMINE 30 MG/ML
INJECTION, SOLUTION INTRAMUSCULAR; INTRAVENOUS PRN
Status: DISCONTINUED | OUTPATIENT
Start: 2023-11-27 | End: 2023-11-27

## 2023-11-27 RX ORDER — FENTANYL CITRATE 50 UG/ML
INJECTION, SOLUTION INTRAMUSCULAR; INTRAVENOUS PRN
Status: DISCONTINUED | OUTPATIENT
Start: 2023-11-27 | End: 2023-11-27

## 2023-11-27 RX ORDER — ONDANSETRON 4 MG/1
4 TABLET, ORALLY DISINTEGRATING ORAL EVERY 30 MIN PRN
Status: DISCONTINUED | OUTPATIENT
Start: 2023-11-27 | End: 2023-11-28 | Stop reason: HOSPADM

## 2023-11-27 RX ORDER — ONDANSETRON 2 MG/ML
INJECTION INTRAMUSCULAR; INTRAVENOUS PRN
Status: DISCONTINUED | OUTPATIENT
Start: 2023-11-27 | End: 2023-11-27

## 2023-11-27 RX ORDER — LIDOCAINE 40 MG/G
CREAM TOPICAL
Status: DISCONTINUED | OUTPATIENT
Start: 2023-11-27 | End: 2023-11-28 | Stop reason: HOSPADM

## 2023-11-27 RX ORDER — ONDANSETRON 4 MG/1
4 TABLET, ORALLY DISINTEGRATING ORAL EVERY 8 HOURS PRN
Qty: 4 TABLET | Refills: 0 | Status: SHIPPED | OUTPATIENT
Start: 2023-11-27

## 2023-11-27 RX ORDER — PROPOFOL 10 MG/ML
INJECTION, EMULSION INTRAVENOUS PRN
Status: DISCONTINUED | OUTPATIENT
Start: 2023-11-27 | End: 2023-11-27

## 2023-11-27 RX ORDER — ONDANSETRON 2 MG/ML
4 INJECTION INTRAMUSCULAR; INTRAVENOUS EVERY 30 MIN PRN
Status: DISCONTINUED | OUTPATIENT
Start: 2023-11-27 | End: 2023-11-28 | Stop reason: HOSPADM

## 2023-11-27 RX ORDER — OXYCODONE HYDROCHLORIDE 5 MG/1
5 TABLET ORAL
Status: DISCONTINUED | OUTPATIENT
Start: 2023-11-27 | End: 2023-11-28 | Stop reason: HOSPADM

## 2023-11-27 RX ORDER — LIDOCAINE HYDROCHLORIDE 20 MG/ML
INJECTION, SOLUTION INFILTRATION; PERINEURAL PRN
Status: DISCONTINUED | OUTPATIENT
Start: 2023-11-27 | End: 2023-11-27

## 2023-11-27 RX ORDER — PROPOFOL 10 MG/ML
INJECTION, EMULSION INTRAVENOUS CONTINUOUS PRN
Status: DISCONTINUED | OUTPATIENT
Start: 2023-11-27 | End: 2023-11-27

## 2023-11-27 RX ORDER — IBUPROFEN 600 MG/1
600 TABLET, FILM COATED ORAL ONCE
Status: DISCONTINUED | OUTPATIENT
Start: 2023-11-27 | End: 2023-11-28 | Stop reason: HOSPADM

## 2023-11-27 RX ADMIN — FENTANYL CITRATE 50 MCG: 50 INJECTION, SOLUTION INTRAMUSCULAR; INTRAVENOUS at 11:01

## 2023-11-27 RX ADMIN — ONDANSETRON 4 MG: 2 INJECTION INTRAMUSCULAR; INTRAVENOUS at 11:01

## 2023-11-27 RX ADMIN — DEXAMETHASONE SODIUM PHOSPHATE 4 MG: 4 INJECTION, SOLUTION INTRA-ARTICULAR; INTRALESIONAL; INTRAMUSCULAR; INTRAVENOUS; SOFT TISSUE at 10:45

## 2023-11-27 RX ADMIN — SODIUM CHLORIDE, SODIUM LACTATE, POTASSIUM CHLORIDE, CALCIUM CHLORIDE: 600; 310; 30; 20 INJECTION, SOLUTION INTRAVENOUS at 10:05

## 2023-11-27 RX ADMIN — PROPOFOL 30 MG: 10 INJECTION, EMULSION INTRAVENOUS at 10:41

## 2023-11-27 RX ADMIN — SODIUM CHLORIDE, SODIUM LACTATE, POTASSIUM CHLORIDE, CALCIUM CHLORIDE: 600; 310; 30; 20 INJECTION, SOLUTION INTRAVENOUS at 10:35

## 2023-11-27 RX ADMIN — PROPOFOL 120 MCG/KG/MIN: 10 INJECTION, EMULSION INTRAVENOUS at 10:39

## 2023-11-27 RX ADMIN — FENTANYL CITRATE 50 MCG: 50 INJECTION, SOLUTION INTRAMUSCULAR; INTRAVENOUS at 10:39

## 2023-11-27 RX ADMIN — KETOROLAC TROMETHAMINE 15 MG: 30 INJECTION, SOLUTION INTRAMUSCULAR; INTRAVENOUS at 11:01

## 2023-11-27 RX ADMIN — LIDOCAINE HYDROCHLORIDE 3 ML: 20 INJECTION, SOLUTION INFILTRATION; PERINEURAL at 10:40

## 2023-11-27 ASSESSMENT — ENCOUNTER SYMPTOMS: ORTHOPNEA: 0

## 2023-11-27 NOTE — OP NOTE
PROCEDURE NOTE - HYSTEROSCOPY AND DILATION AND CURETTAGE     Name: Susan Contreras   : 1950   MRN: [id number]     DATE OF SERVICE:  2023     PREOPERATIVE DIAGNOSIS: Postmenopausal bleeding; bicornuate uterus    POSTOPERATIVE DIAGNOSIS: Postmenopausal uterine bleeding secondary to uterine polyp    PROCEDURES: Hysteroscopy, dilatation and curettage; polypectomy    SURGEON: Arianna Pompa DO     ASSISTANT: OR staff    ANESTHESIA:  MAC    ESTIMATED BLOOD LOSS:   5 mL    HISTORY OF PRESENT ILLNESS:  This is a 73 year old female with history of dysfunctional uterine bleeding thought to be secondary to thickened endometrium. She had a transvaginal ultrasound prior to which showed a thickened endometrium.  She was given informed consent for the above procedure. The risks, benefits and alternatives were discussed with her including but not exclusive to uterine perforation, bleeding and infection. She expressed understanding and wished to proceed.     PROCEDURE:  The patient was brought to the operating room and after induction of MAC anesthesia was prepped and draped in the dorsal lithotomy position. A time out was called and the patient and the procedure were verified.  A bimanual exam was done, indicating a normal mobile uterus. No other abnormalities were noted.     A sterile speculum was then placed. The anterior lip of the cervix was grasped with a single tooth tenaculum. The cervix was gently dilated using Valery dilators and the hysteroscope was introduced without difficulty. The cavity of the uterus appeared bicornuate in nature.  There was a large endometrial polyp encompassing the entire cavity. The MyoSure reach was then passed through the hysteroscope.  The MyoSure device was then used to resect the entire polyp to the base.  Samplings were then taken of the endometrium and both cornua. All quadrants were sampled, and the tissue was submitted for pathologic exam. The uterine cavity was  visualized again and noted to be smooth in contour.  At this point good hemostasis was noted and the hysteroscope was removed. Instruments were removed from vagina. No active bleeding was noted. Sponge and needle counts were correct X 2. She was taken to recovery in stable condition.    SPECIMEN SENT: Endometrial curettings and polyp    Arianna Pompa DO        CC: Jerod Schaeffer, Arianna Pompa, DO

## 2023-11-27 NOTE — DISCHARGE INSTRUCTIONS
You received a dose of IV Toradol at 11:00 AM. Please do not take any additional Ibuprofen/NSAID products (Aleve/Advil/Motrin/Naproxen/Celebrex) until after 5:00 PM.    If you have any questions or concerns regarding your procedure, please contact Dr. Pompa, her office number is [phone].

## 2023-11-27 NOTE — INTERVAL H&P NOTE
"I have reviewed the surgical (or preoperative) H&P that is linked to this encounter, and examined the patient. There are no significant changes    Clinical Conditions Present on Arrival:  Clinically Significant Risk Factors Present on Admission                  # Obesity: Estimated body mass index is 39.55 kg/m  as calculated from the following:    Height as of this encounter: 1.638 m (5' 4.5\").    Weight as of this encounter: 106.1 kg (234 lb).       "

## 2023-11-27 NOTE — ANESTHESIA POSTPROCEDURE EVALUATION
Patient: Susan Contreras    Procedure: Procedure(s):  HYSTEROSCOPY, WITH DILATION AND CURETTAGE       Anesthesia Type:  MAC    Note:  Disposition: Outpatient   Postop Pain Control: Uneventful            Sign Out: Well controlled pain   PONV: No   Neuro/Psych: Uneventful            Sign Out: Acceptable/Baseline neuro status   Airway/Respiratory: Uneventful            Sign Out: Acceptable/Baseline resp. status   CV/Hemodynamics: Uneventful            Sign Out: Acceptable CV status; No obvious hypovolemia; No obvious fluid overload   Other NRE: NONE   DID A NON-ROUTINE EVENT OCCUR? No           Last vitals:  Vitals Value Taken Time   /60 11/27/23 1145   Temp 96.2  F (35.7  C) 11/27/23 1110   Pulse 66 11/27/23 1153   Resp 16 11/27/23 1145   SpO2 99 % 11/27/23 1153   Vitals shown include unfiled device data.    Electronically Signed By: Jerod Toro MD  November 27, 2023  12:08 PM

## 2023-11-27 NOTE — ANESTHESIA PREPROCEDURE EVALUATION
Anesthesia Pre-Procedure Evaluation    Patient: Susan Contreras   MRN: [id number] : 1950        Procedure : Procedure(s):  HYSTEROSCOPY, WITH DILATION AND CURETTAGE          Past Medical History:   Diagnosis Date    Arthritis     PONV (postoperative nausea and vomiting)       Past Surgical History:   Procedure Laterality Date    ARTHROSCOPY SHOULDER      HC DILATION/CURETTAGE DIAG/THER NON OB      Description: Dilation And Curettage;  Recorded: 2014;    HC REMOVAL OF TONSILS,<13 Y/O      Description: Tonsillectomy;  Recorded: 2014;      Allergies   Allergen Reactions    Acetaminophen Unknown    Codeine Nausea and Vomiting    House Dust [Dust Mite Extract] Unknown    Hydrocodone Bitartrate [Hydrocodone] Unknown    Hydrocodone-Acetaminophen Unknown     Abdominal pain      Latex Unknown      Social History     Tobacco Use    Smoking status: Never    Smokeless tobacco: Never   Substance Use Topics    Alcohol use: Not Currently      Wt Readings from Last 1 Encounters:   23 106.1 kg (234 lb)        Anesthesia Evaluation   Pt has had prior anesthetic.     History of anesthetic complications  - PONV.      ROS/MED HX  ENT/Pulmonary:     (+)     DENTON risk factors,   obese,                               Neurologic:  - neg neurologic ROS     Cardiovascular:     (+) Dyslipidemia - -   -  - -                                   (-) taking anticoagulants/antiplatelets, orthopnea/PND and murmur   METS/Exercise Tolerance:     Hematologic:  - neg hematologic  ROS     Musculoskeletal:  - neg musculoskeletal ROS     GI/Hepatic:     (+) GERD (asymptomatic no meds), Other,                  Renal/Genitourinary:  - neg Renal ROS     Endo:     (+)               Obesity,       Psychiatric/Substance Use:  - neg psychiatric ROS     Infectious Disease:       Malignancy:  - neg malignancy ROS     Other:  - neg other ROS          Physical Exam    Airway  airway exam normal      Mallampati: II   TM distance: > 3 FB  "  Neck ROM: full   Mouth opening: > 3 cm    Respiratory Devices and Support         Dental       (+) Completely normal teeth      Cardiovascular   cardiovascular exam normal       Rhythm and rate: regular and normal (-) no murmur    Pulmonary   pulmonary exam normal        breath sounds clear to auscultation           OUTSIDE LABS:  CBC:   Lab Results   Component Value Date    WBC 7.8 11/13/2023    WBC 8.8 09/05/2023    HGB 14.1 11/13/2023    HGB 13.7 09/05/2023    HCT 42.9 11/13/2023    HCT 41.4 09/05/2023     11/13/2023     09/05/2023     BMP:   Lab Results   Component Value Date     09/05/2023     11/14/2022    POTASSIUM 4.5 09/05/2023    POTASSIUM 4.4 11/14/2022    CHLORIDE 100 09/05/2023    CHLORIDE 102 11/14/2022    CO2 28 09/05/2023    CO2 27 11/14/2022    BUN 13.5 09/05/2023    BUN 16.1 11/14/2022    CR 0.87 09/05/2023    CR 0.89 11/14/2022     (H) 10/26/2023     (H) 09/05/2023     COAGS: No results found for: \"PTT\", \"INR\", \"FIBR\"  POC: No results found for: \"BGM\", \"HCG\", \"HCGS\"  HEPATIC:   Lab Results   Component Value Date    ALBUMIN 4.2 09/05/2023    PROTTOTAL 7.4 09/05/2023    ALT 14 09/05/2023    AST 24 09/05/2023    ALKPHOS 82 09/05/2023    BILITOTAL 0.3 09/05/2023     OTHER:   Lab Results   Component Value Date    A1C 6.3 (H) 10/26/2023    BETTY 9.8 09/05/2023    TSH 2.80 10/26/2023       Anesthesia Plan    ASA Status:  2    NPO Status:  NPO Appropriate    Anesthesia Type: MAC.     - Reason for MAC: straight local not clinically adequate              Consents    Anesthesia Plan(s) and associated risks, benefits, and realistic alternatives discussed. Questions answered and patient/representative(s) expressed understanding.     - Discussed: Risks, Benefits and Alternatives for BOTH SEDATION and the PROCEDURE were discussed     - Discussed with:  Patient            Postoperative Care    Pain management: IV analgesics, Oral pain medications, Multi-modal analgesia. "   PONV prophylaxis: Ondansetron (or other 5HT-3), Background Propofol Infusion     Comments:    Other Comments: Risks of MAC anesthesia including but not limited to recall, awareness, and potential conversion to general anesthesia discussed.            Jerod Toro MD

## 2023-11-27 NOTE — ANESTHESIA CARE TRANSFER NOTE
Patient: Susan Contreras    Procedure: Procedure(s):  HYSTEROSCOPY, WITH DILATION AND CURETTAGE       Diagnosis: Postmenopausal bleeding [N95.0]  Diagnosis Additional Information: No value filed.    Anesthesia Type:   MAC     Note:    Oropharynx: oropharynx clear of all foreign objects and spontaneously breathing  Level of Consciousness: awake  Oxygen Supplementation: face mask  Level of Supplemental Oxygen (L/min / FiO2): 6  Independent Airway: airway patency satisfactory and stable  Dentition: dentition unchanged  Vital Signs Stable: post-procedure vital signs reviewed and stable  Report to RN Given: handoff report given  Patient transferred to: PACU    Handoff Report: Identifed the Patient, Identified the Reponsible Provider, Reviewed the pertinent medical history, Discussed the surgical course, Reviewed Intra-OP anesthesia mangement and issues during anesthesia, Set expectations for post-procedure period and Allowed opportunity for questions and acknowledgement of understanding      Vitals:  Vitals Value Taken Time   /63 11/27/23 1110   Temp 96.2  F (35.7  C) 11/27/23 1110   Pulse 83 11/27/23 1111   Resp 16 11/27/23 1110   SpO2 98 % 11/27/23 1111   Vitals shown include unfiled device data.    Electronically Signed By: Jerod Toro MD  November 27, 2023  11:13 AM

## 2023-12-05 ENCOUNTER — IMMUNIZATION (OUTPATIENT)
Dept: NURSING | Facility: CLINIC | Age: 73
End: 2023-12-05
Payer: MEDICARE

## 2023-12-05 PROCEDURE — 91320 SARSCV2 VAC 30MCG TRS-SUC IM: CPT

## 2023-12-05 PROCEDURE — 90480 ADMN SARSCOV2 VAC 1/ONLY CMP: CPT

## 2024-01-23 ENCOUNTER — TELEPHONE (OUTPATIENT)
Dept: FAMILY MEDICINE | Facility: CLINIC | Age: 74
End: 2024-01-23

## 2024-01-23 DIAGNOSIS — Z23 NEED FOR PNEUMOCOCCAL VACCINE: Primary | ICD-10-CM

## 2024-01-23 NOTE — TELEPHONE ENCOUNTER
I pended this future order and have it all ready for you to sign off on. She is coming in tomorrow. Thank you

## 2024-01-23 NOTE — TELEPHONE ENCOUNTER
General Call      Reason for Call:  Pneumonia shot    What are your questions or concerns:  patient scheduled for a Pneumonia shot 1/24/24. Patient would like to double check she is OK to receive this vaccine. Stated to patient 'no news is good news', so if any issues with patient receiving vaccine, please call. If patient is okay to be seen, no call is needed to patient at this time.     Date of last appointment with provider: 11/13/23    Could we send this information to you in [employer] or would you prefer to receive a phone call?:   Patient would prefer a phone call   Okay to leave a detailed message?: Yes at Home number on file [phone] (home)

## 2024-01-23 NOTE — TELEPHONE ENCOUNTER
I would recommend prevnar 20. Are you okay with her receiving this?  Can you place future order for this if you are okay with her receiving this. Thank you

## 2024-01-24 ENCOUNTER — ALLIED HEALTH/NURSE VISIT (OUTPATIENT)
Dept: FAMILY MEDICINE | Facility: CLINIC | Age: 74
End: 2024-01-24
Payer: MEDICARE

## 2024-01-24 DIAGNOSIS — Z23 NEED FOR PNEUMOCOCCAL VACCINE: ICD-10-CM

## 2024-01-24 PROCEDURE — 90677 PCV20 VACCINE IM: CPT

## 2024-01-24 PROCEDURE — G0009 ADMIN PNEUMOCOCCAL VACCINE: HCPCS

## 2024-01-24 PROCEDURE — 99207 PR NO CHARGE NURSE ONLY: CPT

## 2024-01-30 ENCOUNTER — NURSE TRIAGE (OUTPATIENT)
Dept: NURSING | Facility: CLINIC | Age: 74
End: 2024-01-30
Payer: MEDICARE

## 2024-01-30 NOTE — TELEPHONE ENCOUNTER
Triage Call:    Pt calling to report worsening redness, swelling and pain over the last few days after getting a pneumonia vaccine a week ago.     Disposition: Go to office now. Care advice was given and patient was transferred to [facility]. She is aware of the nearby [facility] as well.      Reason for Disposition   Redness or red streak around the injection site begins > 48 hours after shot    Additional Information   Negative: Difficulty breathing or swallowing starts within 2 hours after injection   Negative: Difficult to awaken or acting confused (e.g., disoriented, slurred speech)   Negative: Unresponsive, passed out, or very weak   Negative: Sounds like a life-threatening emergency to the triager   Negative: COVID-19 vaccine reaction suspected (e.g., fever, headache, muscle aches occurring during days 13 after getting vaccine)   Negative: COVID-19 vaccine, questions about   Negative: Fever > 104 F (40 C)   Negative: Measles vaccine rash (onset day 6-12) and purple or blood-colored   Negative: Sounds like a severe, unusual reaction to the triager   Negative: Fever > 100.0 F (37.8 C) and bedridden (e.g., CVA, chronic illness, recovering from surgery)   Negative: Fever > 101 F (38.3 C) begins > 48 hours after getting vaccine and over 60 years of age   Negative: Fever > 100.0 F (37.8 C) and has diabetes mellitus or a weak immune system (e.g., HIV positive, cancer chemotherapy, organ transplant, splenectomy, chronic steroids)    Protocols used: Immunization [id number]  Soledad Khan RN  [facility] Nurse Advisor 10:10 AM 1/30/2024

## 2024-02-14 ENCOUNTER — PATIENT OUTREACH (OUTPATIENT)
Dept: GASTROENTEROLOGY | Facility: CLINIC | Age: 74
End: 2024-02-14
Payer: MEDICARE

## 2024-03-14 ENCOUNTER — PATIENT OUTREACH (OUTPATIENT)
Dept: GASTROENTEROLOGY | Facility: CLINIC | Age: 74
End: 2024-03-14
Payer: MEDICARE

## 2024-03-14 DIAGNOSIS — Z12.11 SPECIAL SCREENING FOR MALIGNANT NEOPLASMS, COLON: Primary | ICD-10-CM

## 2024-03-14 NOTE — PROGRESS NOTES
"Patient has a history of polyps and surveillance colonoscopy is overdue. Patient meets criteria for CRC high risk standing order protocol.    CRC Screening Colonoscopy Referral Review    Patient meets the inclusion criteria for screening colonoscopy standing order.    Ordering/Referring Provider:  Jerod Schaeffer MD    BMI: Estimated body mass index is 39.55 kg/m  as calculated from the following:    Height as of 11/20/23: 1.638 m (5' 4.5\").    Weight as of 11/20/23: 106.1 kg (234 lb).     Sedation:  Does patient have any of the following conditions affecting sedation?  No medical conditions affecting sedation.    Previous Scopes:  Any previous recommendations or follow up needs based on previous scope?  na / No recommendations.    Medical Concerns to Postpone Order:  Does patient have any of the following medical concerns that should postpone/delay colonoscopy referral?  No medical conditions affecting colonoscopy referral.    Final Referral Details:  Based on patient's medical history patient is appropriate for referral order with moderate sedation. If patient's BMI > 50 do not schedule in ASC.  "

## 2024-05-15 ENCOUNTER — NURSE TRIAGE (OUTPATIENT)
Dept: FAMILY MEDICINE | Facility: CLINIC | Age: 74
End: 2024-05-15
Payer: MEDICARE

## 2024-05-15 NOTE — TELEPHONE ENCOUNTER
"Nurse Triage SBAR    Is this a 2nd Level Triage? NO    Situation: Patient called and stated she has a lump on the back of her neck that has been good for 3 years but got bigger today with painful to the touch.     Background: Hx of lump on the skin, osteoarthritis, malignant neopplasm of the GI tract family hx.     Assessment: Patient stated that the lump on the back of her neck as been there for 3 years and was pretty small but hard. Today she noticed that it has grown in size to about the size of the tip of her thumb and it is now painful to the touch. Pt states that she can not see it very well and denies any redness or streaking, denies discharge or presence of any other symptoms. Pt stated that she had a lump very similar under her armpit and when it was tested it was a yeast infection. Patient is requesting a visit with a provider.     Protocol Recommended Disposition:   See in Office Within 3 Days, See More Appropriate Protocol    Recommendation: Pt was given Home care advice and scheduled with a provider on 5/17/24 per disposition.      Does the patient meet one of the following criteria for ADS visit consideration? No    Brenden Ferrara RN  [facility]       Reason for Disposition   Small growth, spot, bump, or pigmented area of skin (e.g., moles, skin tags, wart, melanoma, skin cancer)   Patient wants to be seen    Additional Information   Negative: Sounds like a life-threatening emergency to the triager   Negative: Patient sounds very sick or weak to the triager   Negative: Fever and bump is tender to touch   Negative: Looks infected (e.g., spreading redness, red streak, pus)   Negative: Looks like a boil, infected sore, or deep ulcer   Negative: Caller can't describe it clearly    Answer Assessment - Initial Assessment Questions  1. APPEARANCE of SWELLING: \"What does it look like?\"      Can't really see it but doesn't seem to be red   2. SIZE: \"How large is the swelling?\" (e.g., inches, " "cm; or compare to size of pinhead, tip of pen, eraser, coin, pea, grape, ping pong ball)       About the size of the tip of her thumb   3. LOCATION: \"Where is the swelling located?\"      On the back of the neck on the right side and near the ear.   4. ONSET: \"When did the swelling start?\"      Lump first appeared 3-4 years ago and small and hard but noticed today it is getting bigger.   5. COLOR: \"What color is it?\" \"Is there more than one color?\"      unsure  6. PAIN: \"Is there any pain?\" If Yes, ask: \"How bad is the pain?\" (e.g., scale 1-10; or mild, moderate, severe)      - NONE (0): no pain    - MILD (1-3): doesn't interfere with normal activities     - MODERATE (4-7): interferes with normal activities or awakens from sleep     - SEVERE (8-10): excruciating pain, unable to do any normal activities      1/10  7. ITCH: \"Does it itch?\" If Yes, ask: \"How bad is the itch?\"       No   8. CAUSE: \"What do you think caused the swelling?\"  Not sure the last lump she had biopsied there was a yeast infection   9 OTHER SYMPTOMS: \"Do you have any other symptoms?\" (e.g., fever)      Muscle aches but thinks that is the arthrits    Answer Assessment - Initial Assessment Questions  1. APPEARANCE of LESION: \"What does it look like?\"       Looks like a lump can't really see it   2. SIZE: \"How big is it?\" (e.g., inches, cm; or compare to size of pinhead, tip of pen, eraser, coin, pea, grape, ping pong ball)       About the size of the tip of her thumb  3. COLOR: \"What color is it?\" \"Is there more than one color?\"      Unsure can't see it   4. SHAPE: \"What shape is it?\" (e.g., round, irregular)      Round   5. RAISED: \"Does it stick up above the skin or is it flat?\" (e.g., raised or elevated)      Slightly raised  6. TENDER: \"Does it hurt when you touch it?\"  (Scale 1-10; or mild, moderate, severe)      Yes 1/10  7. LOCATION: \"Where is it located?\"       Right side of the back of the neck below the hair line by the ear  8. ONSET: " "\"When did it first appear?\"       First started 3 years ago and noticed today it is getting bigger and painful to the touch   9. NUMBER: \"Is there just one?\" or \"Are there others?\"      Just one   10. CAUSE: \"What do you think it is?\"        Last time there was a lump in the arm pit it was lanced and biopsied and was a yeast infection.   11. OTHER SYMPTOMS: \"Do you have any other symptoms?\" (e.g., fever)        none  12. PREGNANCY: \"Is there any chance you are pregnant?\" \"When was your last menstrual period?\"        N/a    Protocols used: Skin Lump or Localized Swelling-A-OH, Skin Lesion - Moles or Growths-A-OH    "

## 2024-05-16 PROBLEM — Q51.3 BICORNUATE UTERUS: Status: ACTIVE | Noted: 2024-05-16

## 2024-05-17 ENCOUNTER — OFFICE VISIT (OUTPATIENT)
Dept: FAMILY MEDICINE | Facility: CLINIC | Age: 74
End: 2024-05-17
Payer: MEDICARE

## 2024-05-17 VITALS
WEIGHT: 208 LBS | SYSTOLIC BLOOD PRESSURE: 125 MMHG | TEMPERATURE: 98.3 F | RESPIRATION RATE: 11 BRPM | OXYGEN SATURATION: 97 % | HEART RATE: 65 BPM | BODY MASS INDEX: 34.66 KG/M2 | DIASTOLIC BLOOD PRESSURE: 75 MMHG | HEIGHT: 65 IN

## 2024-05-17 DIAGNOSIS — D36.9 DERMOID CYST: Primary | ICD-10-CM

## 2024-05-17 PROCEDURE — 99213 OFFICE O/P EST LOW 20 MIN: CPT | Performed by: NURSE PRACTITIONER

## 2024-05-17 ASSESSMENT — PAIN SCALES - GENERAL: PAINLEVEL: MILD PAIN (2)

## 2024-05-17 NOTE — PROGRESS NOTES
Assessment and Plan:     Dermoid cyst  This does not appear to be infected today.  Discussed applying warm compresses.  Will refer to dermatology for excision.  Discussed concerning symptoms including redness, swelling, worsening pain, fever.  If these occur, she is to follow with Dr. Schaeffer.  She is content with the plan.  - Adult Dermatology  Referral        Subjective:     Susan is a 74 year old female presenting to the clinic for concerns for growth present on her right posterior neck.  She has had this for 2 to 3 years.  Over the past week, this has increased in size and has become painful to touch.  She experiences some discomfort when she turns her neck.  She denies any injury.  She has not noticed any drainage from the area.  No fever has been present.  She has not taken anything over-the-counter for this.    Reviewof Systems: A complete 14 point review of systems was obtained and is negative or as stated in the history of present illness.    Social History     Socioeconomic History    Marital status:      Spouse name: Not on file    Number of children: Not on file    Years of education: Not on file    Highest education level: Not on file   Occupational History    Not on file   Tobacco Use    Smoking status: Never     Passive exposure: Never    Smokeless tobacco: Never   Vaping Use    Vaping status: Never Used   Substance and Sexual Activity    Alcohol use: Not Currently    Drug use: Never    Sexual activity: Not on file   Other Topics Concern    Not on file   Social History Narrative    Not on file     Social Determinants of Health     Financial Resource Strain: Low Risk  (10/16/2023)    Financial Resource Strain     Within the past 12 months, have you or your family members you live with been unable to get utilities (heat, electricity) when it was really needed?: No   Food Insecurity: Low Risk  (10/16/2023)    Food Insecurity     Within the past 12 months, did you worry that your food  would run out before you got money to buy more?: No     Within the past 12 months, did the food you bought just not last and you didn t have money to get more?: No   Transportation Needs: Low Risk  (10/16/2023)    Transportation Needs     Within the past 12 months, has lack of transportation kept you from medical appointments, getting your medicines, non-medical meetings or appointments, work, or from getting things that you need?: No   Physical Activity: Not on file   Stress: Not on file   Social Connections: Not on file   Interpersonal Safety: Low Risk  (5/17/2024)    Interpersonal Safety     Do you feel physically and emotionally safe where you currently live?: Yes     Within the past 12 months, have you been hit, slapped, kicked or otherwise physically hurt by someone?: No     Within the past 12 months, have you been humiliated or emotionally abused in other ways by your partner or ex-partner?: No   Housing Stability: Low Risk  (10/16/2023)    Housing Stability     Do you have housing? : Yes     Are you worried about losing your housing?: No       Active Ambulatory Problems     Diagnosis Date Noted    Hyperhidrosis     Dysfunctional Uterine Bleeding     Arthritis     Hyperglycemia     Lower Back Pain     Headache     Esophageal Reflux     Lump In / On The Skin  02/23/2015    Migraine 04/26/2016    Osteoarthritis 04/26/2016    Benign neoplasm of colon 02/23/2012    Diverticular disease of colon 02/27/2012    Diverticulosis of colon 02/23/2012    Family history of malignant neoplasm of gastrointestinal tract 02/23/2012    Hemorrhoids 09/04/2018    Obesity (BMI 35.0-39.9) with comorbidity (H) 05/17/2021    Polyp of colon 05/21/2018    History of colonic polyps 05/23/2018    Hyperglycemia 11/13/2023    Bicornuate uterus 05/16/2024     Resolved Ambulatory Problems     Diagnosis Date Noted    No Resolved Ambulatory Problems     Past Medical History:   Diagnosis Date    Arthritis     PONV (postoperative nausea and  "vomiting)        Family History   Problem Relation Age of Onset    Heart Disease Mother     Lymphoma Mother     Diabetes Father     Breast Cancer Sister 84    Ovarian Cancer No family hx of        Objective:     /75   Pulse 65   Temp 98.3  F (36.8  C)   Resp 11   Ht 1.638 m (5' 4.5\")   Wt 94.3 kg (208 lb)   LMP  (LMP Unknown)   SpO2 97%   Breastfeeding No   BMI 35.15 kg/m      Patient is alert, in no obvious distress.   Skin: Warm, dry. [place] size dermoid cyst is present within her right posterior neck.  It is mobile.  No erythema is present.  Mild tenderness to touch.   Lungs:  Clear to auscultation. Respirations even and unlabored.  No wheezing or rales noted.   Heart:  Regular rate and rhythm.  No murmurs.             Answers submitted by the patient for this visit:  General Questionnaire (Submitted on 5/17/2024)  Chief Complaint: Chronic problems general questions HPI Form  What is the reason for your visit today? : Sore/enlarged lump right/back side of neck  How many servings of fruits and vegetables do you eat daily?: 4 or more  On average, how many sweetened beverages do you drink each day (Examples: soda, juice, sweet tea, etc.  Do NOT count diet or artificially sweetened beverages)?: 0  How many minutes a day do you exercise enough to make your heart beat faster?: 9 or less  How many days a week do you exercise enough to make your heart beat faster?: 3 or less  How many days per week do you miss taking your medication?: 0    "

## 2024-09-16 ENCOUNTER — PATIENT OUTREACH (OUTPATIENT)
Dept: CARE COORDINATION | Facility: CLINIC | Age: 74
End: 2024-09-16
Payer: MEDICARE

## 2024-09-23 ENCOUNTER — NURSE TRIAGE (OUTPATIENT)
Dept: FAMILY MEDICINE | Facility: CLINIC | Age: 74
End: 2024-09-23
Payer: MEDICARE
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
## 2024-09-23 NOTE — TELEPHONE ENCOUNTER
See response from the PCP, to the patient, regarding nurse triage from 9/23/24.    Writer called the patient and relayed the above PCP's message to the patient.    Provider Recommendation Follow Up:   Reached patient/caregiver. Informed of provider's recommendations. Patient verbalized understanding and agrees with the plan.          Denies other questions or concerns at this time.    Deneen Aragon RN, BSN  [facility]

## 2024-09-23 NOTE — TELEPHONE ENCOUNTER
I think is reasonable to wait for clinic appointment.  If develops intolerable pain or any additional symptoms would recommend a more emergent evaluation.  Recommend continued use of Tylenol and ice and heat as desired.  Relative rest is ideal but with try to keep moving to a mild extent.  Could consider topical analgesic medication such as topical lidocaine gel or diclofenac (Voltaren) gel both of which could be obtained over-the-counter as a supplemental to her current regiment

## 2024-09-24 ENCOUNTER — OFFICE VISIT (OUTPATIENT)
Dept: FAMILY MEDICINE | Facility: CLINIC | Age: 74
End: 2024-09-24
Payer: MEDICARE

## 2024-09-24 VITALS
TEMPERATURE: 97.6 F | HEART RATE: 75 BPM | BODY MASS INDEX: 34.62 KG/M2 | WEIGHT: 202.8 LBS | RESPIRATION RATE: 20 BRPM | DIASTOLIC BLOOD PRESSURE: 70 MMHG | OXYGEN SATURATION: 98 % | HEIGHT: 64 IN | SYSTOLIC BLOOD PRESSURE: 132 MMHG

## 2024-09-24 DIAGNOSIS — M54.50 ACUTE RIGHT-SIDED LOW BACK PAIN WITHOUT SCIATICA: Primary | ICD-10-CM

## 2024-09-24 PROCEDURE — 99213 OFFICE O/P EST LOW 20 MIN: CPT

## 2024-09-24 ASSESSMENT — ENCOUNTER SYMPTOMS: BACK PAIN: 1

## 2024-09-24 ASSESSMENT — PAIN SCALES - GENERAL: PAINLEVEL: MILD PAIN (3)
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
## 2024-09-30 ENCOUNTER — PATIENT OUTREACH (OUTPATIENT)
Dept: CARE COORDINATION | Facility: CLINIC | Age: 74
End: 2024-09-30
Payer: MEDICARE

## 2024-10-08 ENCOUNTER — IMMUNIZATION (OUTPATIENT)
Dept: FAMILY MEDICINE | Facility: CLINIC | Age: 74
End: 2024-10-08
Payer: MEDICARE

## 2024-10-08 VITALS — TEMPERATURE: 98.1 F

## 2024-10-08 PROCEDURE — 90471 IMMUNIZATION ADMIN: CPT

## 2024-10-08 PROCEDURE — 90662 IIV NO PRSV INCREASED AG IM: CPT

## 2024-10-08 NOTE — PROGRESS NOTES
Prior to immunization administration, verified patients identity using patient s name and date of birth. Please see Immunization Activity for additional information.     Screening Questionnaire for Adult Immunization    Are you sick today?   No   Do you have allergies to medications, food, a vaccine component or latex?   No   Have you ever had a serious reaction after receiving a vaccination?   No   Do you have a long-term health problem with heart, lung, kidney, or metabolic disease (e.g., diabetes), asthma, a blood disorder, no spleen, complement component deficiency, a cochlear implant, or a spinal fluid leak?  Are you on long-term aspirin therapy?   No   Do you have cancer, leukemia, HIV/AIDS, or any other immune system problem?   No   Do you have a parent, brother, or sister with an immune system problem?   No   In the past 3 months, have you taken medications that affect  your immune system, such as prednisone, other steroids, or anticancer drugs; drugs for the treatment of rheumatoid arthritis, Crohn s disease, or psoriasis; or have you had radiation treatments?   No   Have you had a seizure, or a brain or other nervous system problem?   No   During the past year, have you received a transfusion of blood or blood    products, or been given immune (gamma) globulin or antiviral drug?   No   For women: Are you pregnant or is there a chance you could become       pregnant during the next month?   No   Have you received any vaccinations in the past 4 weeks?   No     Immunization questionnaire answers were all negative.    I have reviewed the following standing orders:   This patient is due and qualifies for the Influenza vaccine.    Click here for Influenza Vaccine Standing Order    I have reviewed the vaccines inclusion and exclusion criteria; No concerns regarding eligibility.     Patient instructed to remain in clinic for 15 minutes afterwards, and to report any adverse reactions.     Screening performed by  DACIA PELAEZ on 10/8/2024 at 8:54 AM.

## 2024-10-14 ENCOUNTER — IMMUNIZATION (OUTPATIENT)
Dept: FAMILY MEDICINE | Facility: CLINIC | Age: 74
End: 2024-10-14
Payer: MEDICARE

## 2024-10-14 DIAGNOSIS — Z23 HIGH PRIORITY FOR 2019-NCOV VACCINE: Primary | ICD-10-CM

## 2024-10-14 PROCEDURE — 90480 ADMN SARSCOV2 VAC 1/ONLY CMP: CPT

## 2024-10-14 PROCEDURE — 91320 SARSCV2 VAC 30MCG TRS-SUC IM: CPT

## 2024-10-14 NOTE — PROGRESS NOTES
Prior to immunization administration, verified patients identity using patient s name and date of birth. Please see Immunization Activity for additional information.     Screening Questionnaire for Adult Immunization    Are you sick today?   No   Do you have allergies to medications, food, a vaccine component or latex?   No   Have you ever had a serious reaction after receiving a vaccination?   No   Do you have a long-term health problem with heart, lung, kidney, or metabolic disease (e.g., diabetes), asthma, a blood disorder, no spleen, complement component deficiency, a cochlear implant, or a spinal fluid leak?  Are you on long-term aspirin therapy?   No   Do you have cancer, leukemia, HIV/AIDS, or any other immune system problem?   No   Do you have a parent, brother, or sister with an immune system problem?   No   In the past 3 months, have you taken medications that affect  your immune system, such as prednisone, other steroids, or anticancer drugs; drugs for the treatment of rheumatoid arthritis, Crohn s disease, or psoriasis; or have you had radiation treatments?   No   Have you had a seizure, or a brain or other nervous system problem?   No   During the past year, have you received a transfusion of blood or blood    products, or been given immune (gamma) globulin or antiviral drug?   No   For women: Are you pregnant or is there a chance you could become       pregnant during the next month?   No   Have you received any vaccinations in the past 4 weeks?   Yes - flu shot     Immunization questionnaire answers were all negative.    I have reviewed the following standing orders:   This patient is due and qualifies for the Covid-19 vaccine.     Click here for COVID-19 Standing Order    I have reviewed the vaccines inclusion and exclusion criteria; No concerns regarding eligibility.     Patient instructed to remain in clinic for 15 minutes afterwards, and to report any adverse reactions.     Screening performed by  Jonathan Stone MA on 10/14/2024 at 12:48 PM.

## 2024-10-18 ENCOUNTER — ANCILLARY PROCEDURE (OUTPATIENT)
Dept: MAMMOGRAPHY | Facility: CLINIC | Age: 74
End: 2024-10-18
Payer: MEDICARE

## 2024-10-18 DIAGNOSIS — Z12.31 VISIT FOR SCREENING MAMMOGRAM: ICD-10-CM

## 2024-10-18 PROCEDURE — 77063 BREAST TOMOSYNTHESIS BI: CPT

## 2024-10-21 ENCOUNTER — TELEPHONE (OUTPATIENT)
Dept: FAMILY MEDICINE | Facility: CLINIC | Age: 74
End: 2024-10-21
Payer: MEDICARE

## 2024-12-07 ENCOUNTER — HEALTH MAINTENANCE LETTER (OUTPATIENT)
Age: 74
End: 2024-12-07

## 2024-12-31 ENCOUNTER — NURSE TRIAGE (OUTPATIENT)
Dept: FAMILY MEDICINE | Facility: CLINIC | Age: 74
End: 2024-12-31
Payer: MEDICARE

## 2024-12-31 NOTE — TELEPHONE ENCOUNTER
Incoming call from patient. She was out to eat Thursday night with family. Vomiting and diarrhea started 7 am Friday morning. She had severe vomiting Friday morning but has not vomited since. Chills Friday night but unsure if she had a fever. Diarrhea through the weekend that stopped Monday morning. No vomiting or diarrhea since. States she has lost 6 pounds. She has been able to keep food and fluids down. Patient is wanting to know when she can return to normal activities and being around others.     Reviewed CDC recommendations and patient stated understanding. She had no further questions. Disposition is home care. Gave recommended home care advice.     Reason for Disposition   MILD-MODERATE diarrhea (e.g., 1-6 times / day more than normal)    Additional Information   Negative: Shock suspected (e.g., cold/pale/clammy skin, too weak to stand, low BP, rapid pulse)   Negative: Difficult to awaken or acting confused (e.g., disoriented, slurred speech)   Negative: Sounds like a life-threatening emergency to the triager   Negative: Vomiting also present and worse than the diarrhea   Negative: Blood in stool and without diarrhea   Negative: Diarrhea begins while taking an antibiotic by mouth (oral antibiotic)   Negative: SEVERE abdominal pain (e.g., excruciating) and present > 1 hour   Negative: SEVERE abdominal pain and age > 60 years   Negative: Bloody, black, or tarry bowel movements  (Exception: Chronic-unchanged black-grey bowel movements and is taking iron pills or Pepto-Bismol.)   Negative: SEVERE diarrhea (e.g., 7 or more times / day more than normal) and age > 60 years   Negative: Constant abdominal pain lasting > 2 hours   Negative: Drinking very little and dehydration suspected (e.g., no urine > 12 hours, very dry mouth, very lightheaded)   Negative: Patient sounds very sick or weak to the triager   Negative: SEVERE diarrhea (e.g., 7 or more times / day more than normal) and present > 24 hours (1 day)    Negative: MODERATE diarrhea (e.g., 4-6 times / day more than normal) and present > 48 hours (2 days)   Negative: MODERATE diarrhea (e.g., 4-6 times / day more than normal) and age > 70 years   Negative: Abdominal pain  (Exceptions: Pain clears completely with each passage of diarrhea stool,  or symptoms similar to previously diagnosed irritable bowel syndrome.)   Negative: Fever > 101 F (38.3 C)   Negative: Blood in the stool  (Exception: Only on toilet paper. Reason: Diarrhea can cause rectal irritation with blood on wiping.)   Negative: Mucus or pus in stool has been present > 2 days and diarrhea is more than mild   Negative: Weak immune system (e.g., HIV positive, cancer chemo, splenectomy, organ transplant, chronic steroids)   Negative: Travel to a foreign country in past month   Negative: Recent antibiotic therapy (i.e., within last 2 months) and diarrhea present > 3 days since antibiotic was stopped   Negative: Recent hospitalization and diarrhea present > 3 days   Negative: Tube feedings (e.g., nasogastric, g-tube, j-tube)   Negative: MILD diarrhea (e.g., 1-3 or more stools than normal in past 24 hours) diarrhea and present > 7 days  (Exception: Chronic diarrhea that is not worse.)   Negative: Patient wants to be seen   Negative: Diarrhea is a chronic symptom (recurrent or ongoing AND lasting > 4 weeks)   Negative: SEVERE diarrhea (e.g., 7 or more times / day more than normal)    Protocols used: Diarrhea-A-OH

## 2025-01-23 ENCOUNTER — APPOINTMENT (OUTPATIENT)
Dept: RADIOLOGY | Facility: HOSPITAL | Age: 75
End: 2025-01-23
Payer: MEDICARE

## 2025-01-23 ENCOUNTER — NURSE TRIAGE (OUTPATIENT)
Dept: FAMILY MEDICINE | Facility: CLINIC | Age: 75
End: 2025-01-23
Payer: MEDICARE

## 2025-01-23 ENCOUNTER — APPOINTMENT (OUTPATIENT)
Dept: CT IMAGING | Facility: HOSPITAL | Age: 75
End: 2025-01-23
Payer: MEDICARE

## 2025-01-23 ENCOUNTER — HOSPITAL ENCOUNTER (EMERGENCY)
Facility: HOSPITAL | Age: 75
Discharge: HOME | End: 2025-01-23
Payer: MEDICARE

## 2025-01-23 VITALS
BODY MASS INDEX: 33.77 KG/M2 | HEART RATE: 85 BPM | TEMPERATURE: 98.2 F | SYSTOLIC BLOOD PRESSURE: 134 MMHG | RESPIRATION RATE: 16 BRPM | DIASTOLIC BLOOD PRESSURE: 71 MMHG | HEIGHT: 64 IN | OXYGEN SATURATION: 100 % | WEIGHT: 197.8 LBS

## 2025-01-23 DIAGNOSIS — S09.90XA INJURY OF HEAD, INITIAL ENCOUNTER: ICD-10-CM

## 2025-01-23 PROCEDURE — 73130 X-RAY EXAM OF HAND: CPT | Mod: RT

## 2025-01-23 PROCEDURE — 70450 CT HEAD/BRAIN W/O DYE: CPT

## 2025-01-23 PROCEDURE — 72125 CT NECK SPINE W/O DYE: CPT

## 2025-01-23 PROCEDURE — 99285 EMERGENCY DEPT VISIT HI MDM: CPT | Mod: 25

## 2025-01-23 ASSESSMENT — COLUMBIA-SUICIDE SEVERITY RATING SCALE - C-SSRS
1. IN THE PAST MONTH, HAVE YOU WISHED YOU WERE DEAD OR WISHED YOU COULD GO TO SLEEP AND NOT WAKE UP?: NO
6. HAVE YOU EVER DONE ANYTHING, STARTED TO DO ANYTHING, OR PREPARED TO DO ANYTHING TO END YOUR LIFE?: NO
2. HAVE YOU ACTUALLY HAD ANY THOUGHTS OF KILLING YOURSELF IN THE PAST MONTH?: NO

## 2025-01-23 ASSESSMENT — ACTIVITIES OF DAILY LIVING (ADL)
ADLS_ACUITY_SCORE: 42
ADLS_ACUITY_SCORE: 42

## 2025-01-23 NOTE — TELEPHONE ENCOUNTER
Writer huddled with Dr. Bishop, regarding nurse triage, from 1/23/25, regarding patient's fall.    Dr. Bishop recommended that the patient go to the ED for evaluation as she may need imaging of her head due to head injury from fall today, 1/23/25.    Writer called the patient and relayed the above message to the patient.    Provider Recommendation Follow Up:   Reached patient/caregiver. Informed of provider's recommendations. Patient verbalized understanding and agrees with the plan.          Writer recommended that the patient have someone drive her to the ED and patient stated that she will have her sister drive her to the [facility] ED now for evaluation.    Denies other questions or concerns at this time.    Deneen Aragon RN, BSN  [facility]

## 2025-01-23 NOTE — DISCHARGE INSTRUCTIONS
You are seen in the emergency department after a fall.  Your imaging is reassuring without any evidence of skull or facial fracture, spinal fracture, or bleeding in the brain.  You do, however, have a right eyebrow/eye hematoma.  This will have to gradually get better on its own.  [place] will pull it downward and you likely will continue to get bruising even under the eye or the other eye.  You can continue to ice it.  It is reassuring that you do not have any actual vision changes or pain with eye movements.  If you develop any new or worsening symptoms, return to the emergency department.
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
## 2025-01-23 NOTE — TELEPHONE ENCOUNTER
Nurse Triage SBAR    Is this a 2nd Level Triage? YES, LICENSED PRACTITIONER REVIEW IS REQUIRED    Situation:   Telephone call into the clinic, from the patient, on 1/23/25, to report a fall with injuries    Background:   Hx of migraines, GERD, diverticulosis, arthritis, and OA    Assessment:   Patient reports that she was walking into the [facility] this morning, around 9:30 am, for a painting class, and either slipped or tripped on ice or the curb and fell directly on the right side of her forehead and face, as well as injured her right hand    Stated that she now has swelling on above her right upper eyelid and forehead that is at least 1.5 inches from her scalp and around the size of the palm of her hand    Also has swelling in her upper lip and stated that her teeth hurt    Stated that her right upper eyelid is starting to bruise significantly    Rated pain as 3 out of ten in right hand-can bend her hand, but it is sore    Rated pain in face is 10 out of ten on palpation    Patient stated that EMS was called to the scene and EMS recommended that the patient go to the ED for evaluation, but she declined ED evaluation at the time    Denies any LOC, seizure activity, dizziness, lightheadedness, headache, slurred speech, confusion, cuts or lacerations anywhere, chest pain, trouble breathing, vomiting, weakness or numbness anywhere, or changes to vision    Protocol Recommended Disposition:   Call ADS/Go to ED/UCC Now (Or To Office with PCP Approval), See More Appropriate Protocol    Recommendation:   Gave care advice. Recommended that the patient be evaluated in the ED due to disposition, as well as large facial swelling and head injury.    Patient verbalized understanding, but declined ED evaluation at this time.    Patient would like to ask the provider if she needs ED evaluation or if UC evaluation could be an option.    Writer will route the above information to the covering provider to review and advise next  steps.    Denies other questions or concerns at this time.    Routed to provider    Does the patient meet one of the following criteria for ADS visit consideration? 16+ years old, with an [facility] PCP     TIP  Providers, please consider if this condition is appropriate for management at one of our Acute and Diagnostic Services sites.     If patient is a good candidate, please use dotphrase <dot>triageresponse and select Refer to ADS to document.     Reason for Disposition   Patient has a concerning injury to a specific part of the body (e.g., chest, leg, head)   Head or forehead injury is main concern   Age over 64 years with an area of head swelling or bruise    Additional Information   Negative: Major injury from dangerous force (e.g., fall > 10 feet or 3 meters)   Negative: Major bleeding (e.g., actively dripping or spurting) and can't be stopped   Negative: Shock suspected (e.g., cold/pale/clammy skin, too weak to stand)   Negative: Difficult to awaken or acting confused (e.g., disoriented, slurred speech)   Negative: SEVERE weakness (e.g., unable to walk or barely able to walk, requires support) and new-onset or getting worse   Negative: Can't stand (bear weight) or walk and new-onset after fall   Negative: Sounds like a life-threatening emergency to the triager   Negative: Passed out (e.g., fainted, lost consciousness, blacked out and was not responding)   Negative: Weakness of the face, arm or leg on one side of the body AND new-onset or getting worse   Negative: Dizziness described as lightheadedness (no spinning sensation or trouble with balance) AND new-onset or getting worse   Negative: Dizziness described as spinning or off balance (vertigo) AND new-onset or getting worse   Negative: Pregnant and fall   Negative: Major bleeding (actively dripping or spurting) that can't be stopped   Negative: Bullet, knife or other serious penetrating wound   Negative: Difficulty breathing or choking   Negative: Knocked out  (unconscious) > 1 minute   Negative: Difficult to awaken or acting confused (e.g., disoriented, slurred speech)   Negative: Severe neck pain   Negative: Sounds like a life-threatening emergency to the triager   Negative: ACUTE NEURO SYMPTOM and symptom present now (Definition: Difficult to awaken OR confused thinking and talking OR slurred speech OR weakness of arms or legs OR unsteady walking.)   Negative: Knocked out (unconscious) > 1 minute   Negative: Seizure (convulsion) occurred  (Exception: Prior history of seizures and now alert and without Acute Neuro Symptoms.)   Negative: Neck pain after dangerous injury (e.g., MVA, diving, trampoline, contact sports, fall > 10 feet or 3 meters)  (Exception: Neck pain began > 1 hour after injury.)   Negative: Major bleeding (actively dripping or spurting) that can't be stopped   Negative: Penetrating head injury (e.g., knife, gunshot wound, metal object)   Negative: Sounds like a life-threatening emergency to the triager   Negative: Diagnosed with a concussion within last 14 days   Negative: Can't remember what happened (amnesia)   Negative: Vomiting once or more   Negative: Loss of vision or double vision is present now   Negative: Watery or blood-tinged fluid dripping from the nose or ears   Negative: ACUTE NEURO SYMPTOM and now fine  (Definition: Difficult to awaken OR confused thinking and talking OR slurred speech OR weakness of arms or legs OR unsteady walking.)   Negative: Knocked out (unconscious) < 1 minute and now fine   Negative: SEVERE headache   Negative: Dangerous injury (e.g., MVA, diving, trampoline, contact sports, fall > 10 feet or 3 meters) or severe blow from hard object (e.g., golf club or baseball bat)   Negative: Large swelling or bruise (> 2 inches or 5 cm)   Negative: Skin is split open or gaping (length > 1/2 inch or 12 mm)   Negative: Bleeding won't stop after 10 minutes of direct pressure (using correct technique)   Negative: Taking Coumadin  "(warfarin) or other strong blood thinner, or known bleeding disorder (e.g., thrombocytopenia)    Answer Assessment - Initial Assessment Questions  1. MECHANISM: \"How did the fall happen?\"        Alert and oriented and tripped on the curb    2. DOMESTIC VIOLENCE AND ELDER ABUSE SCREENING: \"Did you fall because someone pushed you or tried to hurt you?\" If Yes, ask: \"Are you safe now?\"        Fell on the sidewalk when going to the [facility] this morning when going to a painting class      3. ONSET: \"When did the fall happen?\" (e.g., minutes, hours, or days ago)        9:30 am this morning, 1/23/25    4. LOCATION: \"What part of the body hit the ground?\" (e.g., back, buttocks, head, hips, knees, hands, head, stomach)        Right side of face and right hand      5. INJURY: \"Did you hurt (injure) yourself when you fell?\" If Yes, ask: \"What did you injure? Tell me more about this?\" (e.g., body area; type of injury; pain severity)\"        Eyelid swelling is as big at the palm of her hand, top lip and teeth hurt, right cheek redness, right hand pain      6. PAIN: \"Is there any pain?\" If Yes, ask: \"How bad is the pain?\" (e.g., Scale 0-10; or none, mild,         Rated pain as 3 out of ten in hand-can bend her hand    Rated pain in face is 10 out of ten on palpation      7. SIZE: For cuts, bruises, or swelling, ask: \"How large is it?\" (e.g., inches or centimeters)         Size of the palm of her hand on her right forehead    8. PREGNANCY: \"Is there any chance you are pregnant?\" \"When was your last menstrual period?\"        N/A      9. OTHER SYMPTOMS: \"Do you have any other symptoms?\" (e.g., dizziness, fever, weakness; new-onset or worsening).         Denies headache, dizziness, lightheadedness, chest pain, trouble breathing, LOC, seizure activity, leg/foot/hip/knee injuries      10. CAUSE: \"What do you think caused the fall (or falling)?\" (e.g., dizzy spell, tripped)          Walking fast and tripped on the curb, and there was ice " present    Protocols used: Falls and [id number], Face Injury-A-OH, Head Injury-A-OH    Deneen Aragon RN, BSN  [facility]

## 2025-01-24 NOTE — ED PROVIDER NOTES
"EMERGENCY DEPARTMENT ENCOUNTER      NAME: Susan Contreras  AGE: 74 year old female  YOB: 1950  MRN: [id number]  EVALUATION DATE & TIME: 1/23/2025  2:19 PM    PCP: Jerod Schaeffer    ED PROVIDER: Santa Anderson PA-C      Chief Complaint   Patient presents with    Fall    Head Injury       FINAL IMPRESSION:  1. Injury of head, initial encounter        ED COURSE & MEDICAL DECISION MAKING:    Pertinent Labs & Imaging studies reviewed. (See chart for details)  74 year old female presents to the Emergency Department for evaluation of fall and head injury that occurred at 930 am this morning.     ED Course as of 01/26/25 1538   Thu Jan 23, 2025   1436 Patient is a 74-year-old female with no pertinent medical history who presents today for referral from primary care after a fall that occurred at 9:30 AM today.  This was a mechanical fall.  No loss of consciousness.  Is not on thinners.  As the day has gone on, she has noticed that the swelling above her right eye has increased substantially.  She denies vision changes or pain with eye movement.  She denies headache, neck pain, vomiting, or any other symptoms.  She called the nurse triage line and they recommended she come to the ER to \"rule out a concussion\".   1440 Vitals with mild tachycardia at 108 and mildly hypertensive at 161/89. On exam, hematoma noted at the right eyebrow that extends down into the right eye lid. Overlying ecchymosis. Right upper lip enlarged. Ecchymosis noted to the bridge of the nose. EOMs intact and painless. PERRLA. CN III-XII tested and intact. Strength 5/5 in upper and lower extremities. GCS 15. No tenderness to palpation on the maxillary or mandibular regions on the right side. No loose or broken teeth. Tenderness to palpation along the 5th metacarpal and pain with flexion and extension of the 5th finger. No wrist pain. Radial pulse 2+. Sensation intact distally. No tenderness to palpation of the cervical spine and normal " neck ROM.   1526 Using English head CT rules, given her age > 65, will obtain head and neck CT to assess for injuries. Less likely given normal neuro exam and no neck pain here. Will also add on a hand xray given the bony tenderness. Declining anything for pain here at this time.    1600 CT head and neck without intracranial hemorrhage or cervical spinal fracture/subluxation. Discussed ice and tylenol for the hematoma/swelling. Discussed follow back up with PCP. She may have a concussion as well and discussed symptomatology and follow up for that as well. But no evidence of a more serious head or neck injury from the fall. Discharged in stable condition and return precautions discussed.      At the conclusion of the encounter I discussed the results of all of the tests and the disposition. The questions were answered. The patient or family acknowledged understanding and was agreeable with the care plan.     Medical Decision Making  Obtained supplemental history:Supplemental history obtained?: Documented in chart and Family Member/Significant Other  Reviewed external records: External records reviewed?: Documented in chart and Other: nurse triage phone call from 1/23/25  Care impacted by chronic illness:Documented in Chart  Did you consider but not order tests?: Work up considered but not performed and documented in chart, if applicable  Did you interpret images independently?: My preliminary independent interpretation of images are head CT without intracranial hemorrhage .  See radiology notes for final report.  Consultation discussion with other provider:Did you involve another provider (consultant, , pharmacy, etc.)?: No  Discharge. No recommendations on prescription strength medication(s). See documentation for any additional details.    MIPS: Adult Minor Head Trauma:Age 65 years or older    0 minutes of critical care time     MEDICATIONS GIVEN IN THE EMERGENCY:  Medications - No data to display    NEW  "PRESCRIPTIONS STARTED AT TODAY'S ER VISIT  Discharge Medication List as of 1/23/2025  4:10 PM             =================================================================    HPI    Patient information was obtained from: patient     Use of : N/A     Patient is a 74-year-old female with no pertinent medical history who presents today for referral from primary care after a fall that occurred at 9:30 AM today.  This was a mechanical fall.  She was stepping up onto the curb when she did not lift her foot high enough and fell face first onto the cement.  No loss of consciousness.  Is not on thinners.  This was outside of the [facility].  EMS was called and she was evaluated and then drove herself home.  As the day has gone on, she has noticed that the swelling above her right eye has increased substantially.  She denies vision changes or pain with eye movement.  She denies headache, neck pain, vomiting, or any other symptoms.  She called the nurse triage line and they recommended she come to the ER to \"rule out a concussion\".    VITALS:  /71   Pulse 85   Temp 98.2  F (36.8  C) (Oral)   Resp 16   Ht 1.626 m (5' 4\")   Wt 89.7 kg (197 lb 12.8 oz)   LMP  (LMP Unknown)   SpO2 100%   BMI 33.95 kg/m      PHYSICAL EXAM    Physical Exam  Constitutional:       General: She is not in acute distress.     Appearance: Normal appearance. She is not ill-appearing.   HENT:      Head: Contusion present. No raccoon eyes, Santiago's sign, abrasion or laceration.        Comments: Right eye swelling/hematoma noted. Normal/painless EOMs. PERRLA. Vision at baseline. No hyphema. No santiago sign or raccon eyes. No palpable skull fracture.      Mouth/Throat:      Comments: No broken teeth or loose teeth. Swollen right upper lip without laceration or bleeding. No facial bone tenderness including maxilla or mandible. Can open jaw easily without pain  Eyes:      Extraocular Movements: Extraocular movements intact.      Right eye: " Normal extraocular motion and no nystagmus.      Left eye: Normal extraocular motion and no nystagmus.      Conjunctiva/sclera: Conjunctivae normal.      Right eye: Right conjunctiva is not injected. No hemorrhage.     Left eye: Left conjunctiva is not injected. No hemorrhage.     Pupils: Pupils are equal, round, and reactive to light. Pupils are equal.      Comments: Lid slightly drooped due to the swelling superior to the eye from the eyebrow region.    Cardiovascular:      Rate and Rhythm: Normal rate and regular rhythm.      Pulses: Normal pulses.      Heart sounds: Normal heart sounds.   Pulmonary:      Effort: Pulmonary effort is normal.      Breath sounds: Normal breath sounds.   Musculoskeletal:         General: Tenderness present. No swelling.      Cervical back: Normal range of motion and neck supple. No tenderness.      Comments: Tenderness to palpation along the right 5th metacarpal. Radial pulse 2+. Cap refill <2 pain with flexion and extension of the right 5th finger   Skin:     General: Skin is warm.      Capillary Refill: Capillary refill takes less than 2 seconds.   Neurological:      General: No focal deficit present.      Mental Status: She is alert and oriented to person, place, and time.      Cranial Nerves: No cranial nerve deficit.      Sensory: No sensory deficit.      Motor: No weakness.      Coordination: Coordination normal.      Gait: Gait normal.   Psychiatric:         Mood and Affect: Mood normal.           RADIOLOGY:  Reviewed all pertinent imaging. Please see official radiology report.  XR Hand Right G/E 3 Views   Final Result   IMPRESSION: No evidence of an acute displaced right hand fracture. Advanced arthritic change thumb CMC, index, and middle DIP joints and thumb IP joint.      CT Cervical Spine w/o Contrast   Final Result   IMPRESSION:   1.  No acute cervical spine fracture.      CT Head w/o Contrast   Final Result   IMPRESSION:   1.  No acute intracranial abnormality. Right  frontal scalp soft tissue swelling. No underlying calvarial fracture.          Santa Anderson PA-C  [facility] EMERGENCY DEPARTMENT  [facility] [facility] [id number] [phone]       Santa Anderson PA-C  01/26/25 7240

## 2025-01-30 ENCOUNTER — OFFICE VISIT (OUTPATIENT)
Dept: FAMILY MEDICINE | Facility: CLINIC | Age: 75
End: 2025-01-30
Payer: COMMERCIAL

## 2025-01-30 VITALS
BODY MASS INDEX: 32.78 KG/M2 | SYSTOLIC BLOOD PRESSURE: 118 MMHG | TEMPERATURE: 98.2 F | OXYGEN SATURATION: 97 % | HEIGHT: 64 IN | RESPIRATION RATE: 17 BRPM | DIASTOLIC BLOOD PRESSURE: 70 MMHG | WEIGHT: 192 LBS | HEART RATE: 86 BPM

## 2025-01-30 DIAGNOSIS — T14.8XXA HEMATOMA OF SKIN: ICD-10-CM

## 2025-01-30 DIAGNOSIS — R73.01 IMPAIRED FASTING GLUCOSE: ICD-10-CM

## 2025-01-30 DIAGNOSIS — S09.90XD CLOSED HEAD INJURY, SUBSEQUENT ENCOUNTER: Primary | ICD-10-CM

## 2025-01-30 DIAGNOSIS — H33.312 RETINAL TEAR OF LEFT EYE: ICD-10-CM

## 2025-01-30 DIAGNOSIS — M19.041 ARTHRITIS OF RIGHT HAND: ICD-10-CM

## 2025-01-30 ASSESSMENT — PAIN SCALES - GENERAL: PAINLEVEL_OUTOF10: MILD PAIN (1)

## 2025-01-30 NOTE — PROGRESS NOTES
Assessment/Plan:    Closed head injury, subsequent encounter  Closed head injury reviewed status post fall January 23, 2025 without describe loss of consciousness.  No residual headache etc.  No vision concerns at this time.  Falls risk prevention reviewed.    Hematoma of skin  Right periorbital ecchymosis with hematoma associated with closed head injury as noted above.  Resolving as anticipated with ongoing resolution over next 2 weeks likely.    Retinal tear of left eye  Described left retinal tear on eye exam after being seen by associated eye clinic January 27, 2025 following head injury.  Was asymptomatic at that time otherwise regarding visual acuity etc. however described retinal tear noted and does have an appointment January 31, 2025 with Dr. Zacarias Noel vitreoretinal surgery to determine need for further intervention.    Arthritis of right hand  Hand arthritis noted.  Right hand sprain associated with recent fall with described improvement.  X-rays did not show fracture but did show significant arthritis including thumb CMC joint as well as index and middle DIP joints and thumb IP joint.    Impaired fasting glucose  Impaired fasting glucose with prior A1c increasing from 6% to 6.3% October 26, 2023 and will schedule annual wellness visit with Dr. Gardner in the next 3 months to ensure fasting lab completion and further risk factor monitoring.               Subjective:    Susan CELAYA Ben is seen today for follow-up assessment.  Was seen through [facility] emergency department January 23, 2025 after sustaining a fall while walking into a painting class.  Likely tripped.  Hit right eyebrow region on the ground as well as injuring right hand.  Was seen through [facility] ER.  CT head and neck without fracture.  Had x-rays right hand without fracture but did show significant osteoarthritis findings.  Symptomatic treatments reviewed.  Due to risks of head injury with fall did see associated eye clinic January  2025 and describes left eye retinal tear and now scheduled to be seen 2025 for potential laser treatment with Dr. Zacarias Rolon with vitreoretinal surgery.  Impaired fasting glucose historically with A1c increasing from 6% to 6.3% 2023.  Describes nearly 40 pound weight loss however some weight regain over holidays.  Comprehensive review of systems as above otherwise all negative.       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) - prior 122 words per minute without errors   Hobbies:  horseback riding (but not 10 years); love to write; love to read       Past Surgical History:   Procedure Laterality Date    ARTHROSCOPY SHOULDER      DILATION AND CURETTAGE, OPERATIVE HYSTEROSCOPY, COMBINED N/A 2023    Procedure: HYSTEROSCOPY, WITH DILATION AND CURETTAGE;  Surgeon: Arianna Pompa DO;  Location: [facility] OR     DILATION/CURETTAGE DIAG/THER NON OB      Description: Dilation And Curettage;  Recorded: 2014;    HC REMOVAL OF TONSILS,<13 Y/O      Description: Tonsillectomy;  Recorded: 2014;        Family History   Problem Relation Age of Onset    Heart Disease Mother     Lymphoma Mother     Diabetes Father     Breast Cancer Sister 84    Ovarian Cancer No family hx of         Past Medical History:   Diagnosis Date    Arthritis     PONV (postoperative nausea and vomiting)         Social History     Tobacco Use    Smoking status: Never     Passive exposure: Never    Smokeless tobacco: Never   Vaping Use    Vaping status: Never Used   Substance Use Topics    Alcohol use: Not Currently    Drug use: Never     "    Current Outpatient Medications   Medication Sig Dispense Refill    diclofenac (VOLTAREN) 1 % topical gel Apply 4 g topically 4 times daily. 350 g 0    ibuprofen (ADVIL/MOTRIN) 600 MG tablet Take 1 tablet (600 mg) by mouth every 6 hours as needed for other (mild and/or inflammatory pain) 30 tablet 0    ondansetron (ZOFRAN ODT) 4 MG ODT tab Take 1 tablet (4 mg) by mouth every 8 hours as needed for nausea 4 tablet 0          Objective:    Vitals:    01/30/25 1235   BP: 118/70   Pulse: 86   Resp: 17   Temp: 98.2  F (36.8  C)   SpO2: 97%   Weight: 87.1 kg (192 lb)   Height: 1.626 m (5' 4\")      Body mass index is 32.96 kg/m .    Right periorbital ecchymoses.  No significant abrasion residual or signs of secondary infection.  Sclera look normal.  Extraocular eye muscles intact.  No decrease in visual acuity identified.  Osteoarthritic changes of DIP and PIP joints of right hand noted otherwise resolved swelling.      RADIOLOGY:  Reviewed all pertinent imaging. Please see official radiology report.  XR Hand Right G/E 3 Views   Final Result   IMPRESSION: No evidence of an acute displaced right hand fracture. Advanced arthritic change thumb CMC, index, and middle DIP joints and thumb IP joint.       CT Cervical Spine w/o Contrast   Final Result   IMPRESSION:   1.  No acute cervical spine fracture.       CT Head w/o Contrast   Final Result   IMPRESSION:   1.  No acute intracranial abnormality. Right frontal scalp soft tissue swelling. No underlying calvarial fracture.           This note has been dictated using voice recognition software and as a result may contain minor grammatical errors and unintended word substitutions.         Answers submitted by the patient for this visit:  General Questionnaire (Submitted on 1/30/2025)  Chief Complaint: Chronic problems general questions HPI Form  How many days per week do you miss taking your medication?: 0  General Concern (Submitted on 1/30/2025)  Chief Complaint: Chronic " problems general questions HPI Form  What is the reason for your visit today?: Fell face down on [place] sidewalk at [facility]  When did your symptoms begin?: 1-2 weeks ago  What are your symptoms?: still swelling fingers forehead some snapping in knees and     shoulders  How would you describe these symptoms?: Moderate  Are your symptoms:: Improving  Have you had these symptoms before?: No  Is there anything that makes you feel worse?: not getting well  Is there anything that makes you feel better?: swelling going down and no laser eye surgery  Questionnaire about: Chronic problems general questions HPI Form (Submitted on 1/30/2025)  Chief Complaint: Chronic problems general questions HPI Form

## 2025-03-13 ENCOUNTER — PATIENT OUTREACH (OUTPATIENT)
Dept: GASTROENTEROLOGY | Facility: CLINIC | Age: 75
End: 2025-03-13
Payer: MEDICARE

## 2025-03-13 DIAGNOSIS — Z12.11 SPECIAL SCREENING FOR MALIGNANT NEOPLASMS, COLON: Primary | ICD-10-CM

## 2025-03-13 NOTE — PROGRESS NOTES
"Patient has a history of polyps and surveillance colonoscopy is overdue. Patient meets criteria for CRC high risk standing order protocol.    CRC Screening Colonoscopy Referral Review    Patient meets the inclusion criteria for screening colonoscopy standing order.    Ordering/Referring Provider:  Jerod Schaeffer MD    BMI: Estimated body mass index is 32.96 kg/m  as calculated from the following:    Height as of 1/30/25: 1.626 m (5' 4\").    Weight as of 1/30/25: 87.1 kg (192 lb).     Sedation:  Does patient have any of the following conditions affecting sedation?  No medical conditions affecting sedation.    Previous Scopes:  Any previous recommendations or follow up needs based on previous scope?  na / No recommendations.    Medical Concerns to Postpone Order:  Does patient have any of the following medical concerns that should postpone/delay colonoscopy referral?  No medical conditions affecting colonoscopy referral.    Final Referral Details:  Based on patient's medical history patient is appropriate for referral order with moderate sedation. If patient's BMI > 50 do not schedule in [facility].  " PAST SURGICAL HISTORY:  History of ovarian cyst

## 2025-04-14 ENCOUNTER — PATIENT OUTREACH (OUTPATIENT)
Dept: CARE COORDINATION | Facility: CLINIC | Age: 75
End: 2025-04-14
Payer: MEDICARE

## 2025-04-21 ENCOUNTER — PATIENT OUTREACH (OUTPATIENT)
Dept: CARE COORDINATION | Facility: CLINIC | Age: 75
End: 2025-04-21
Payer: MEDICARE

## 2025-05-02 PROBLEM — E66.01 MORBID OBESITY (H): Status: RESOLVED | Noted: 2021-05-17 | Resolved: 2025-05-02

## 2025-06-12 ENCOUNTER — TRANSFERRED RECORDS (OUTPATIENT)
Dept: HEALTH INFORMATION MANAGEMENT | Facility: CLINIC | Age: 75
End: 2025-06-12
Payer: MEDICARE